# Patient Record
Sex: FEMALE | Race: BLACK OR AFRICAN AMERICAN | NOT HISPANIC OR LATINO
[De-identification: names, ages, dates, MRNs, and addresses within clinical notes are randomized per-mention and may not be internally consistent; named-entity substitution may affect disease eponyms.]

---

## 2017-01-06 ENCOUNTER — APPOINTMENT (OUTPATIENT)
Dept: CARDIOLOGY | Facility: CLINIC | Age: 72
End: 2017-01-06

## 2017-04-03 ENCOUNTER — APPOINTMENT (OUTPATIENT)
Dept: CARDIOLOGY | Facility: CLINIC | Age: 72
End: 2017-04-03

## 2017-04-03 VITALS — HEART RATE: 65 BPM | SYSTOLIC BLOOD PRESSURE: 130 MMHG | DIASTOLIC BLOOD PRESSURE: 80 MMHG

## 2017-04-03 VITALS — WEIGHT: 244 LBS | BODY MASS INDEX: 40.65 KG/M2 | HEIGHT: 65 IN

## 2017-04-03 DIAGNOSIS — R73.03 PREDIABETES.: ICD-10-CM

## 2017-04-14 ENCOUNTER — APPOINTMENT (OUTPATIENT)
Dept: CARDIOLOGY | Facility: CLINIC | Age: 72
End: 2017-04-14

## 2017-06-01 ENCOUNTER — APPOINTMENT (OUTPATIENT)
Dept: CARDIOLOGY | Facility: CLINIC | Age: 72
End: 2017-06-01

## 2017-06-01 VITALS — BODY MASS INDEX: 38.27 KG/M2 | WEIGHT: 230 LBS

## 2017-06-01 VITALS — DIASTOLIC BLOOD PRESSURE: 58 MMHG | SYSTOLIC BLOOD PRESSURE: 90 MMHG

## 2017-10-02 ENCOUNTER — APPOINTMENT (OUTPATIENT)
Dept: CARDIOLOGY | Facility: CLINIC | Age: 72
End: 2017-10-02

## 2017-11-22 ENCOUNTER — APPOINTMENT (OUTPATIENT)
Dept: CARDIOLOGY | Facility: CLINIC | Age: 72
End: 2017-11-22

## 2018-05-25 ENCOUNTER — APPOINTMENT (OUTPATIENT)
Dept: CARDIOLOGY | Facility: CLINIC | Age: 73
End: 2018-05-25

## 2018-05-25 ENCOUNTER — INPATIENT (INPATIENT)
Facility: HOSPITAL | Age: 73
LOS: 3 days | Discharge: NURSING, REHAB OR LONG-TERM CARE | End: 2018-05-29
Attending: INTERNAL MEDICINE | Admitting: INTERNAL MEDICINE

## 2018-05-25 VITALS — DIASTOLIC BLOOD PRESSURE: 60 MMHG | BODY MASS INDEX: 30.62 KG/M2 | SYSTOLIC BLOOD PRESSURE: 100 MMHG | WEIGHT: 184 LBS

## 2018-05-25 VITALS
HEART RATE: 79 BPM | RESPIRATION RATE: 18 BRPM | SYSTOLIC BLOOD PRESSURE: 110 MMHG | TEMPERATURE: 99 F | DIASTOLIC BLOOD PRESSURE: 58 MMHG | OXYGEN SATURATION: 95 %

## 2018-05-25 DIAGNOSIS — Z95.0 PRESENCE OF CARDIAC PACEMAKER: Chronic | ICD-10-CM

## 2018-05-25 LAB
ALBUMIN SERPL ELPH-MCNC: 4.3 G/DL — SIGNIFICANT CHANGE UP (ref 3.5–5.2)
ALP SERPL-CCNC: 49 U/L — SIGNIFICANT CHANGE UP (ref 30–115)
ALT FLD-CCNC: 13 U/L — SIGNIFICANT CHANGE UP (ref 0–41)
ANION GAP SERPL CALC-SCNC: 13 MMOL/L — SIGNIFICANT CHANGE UP (ref 7–14)
APTT BLD: 33.8 SEC — SIGNIFICANT CHANGE UP (ref 27–39.2)
AST SERPL-CCNC: 35 U/L — SIGNIFICANT CHANGE UP (ref 0–41)
BASOPHILS # BLD AUTO: 0.05 K/UL — SIGNIFICANT CHANGE UP (ref 0–0.2)
BASOPHILS NFR BLD AUTO: 0.7 % — SIGNIFICANT CHANGE UP (ref 0–1)
BILIRUB SERPL-MCNC: 0.4 MG/DL — SIGNIFICANT CHANGE UP (ref 0.2–1.2)
BUN SERPL-MCNC: 28 MG/DL — HIGH (ref 10–20)
CALCIUM SERPL-MCNC: 9.3 MG/DL — SIGNIFICANT CHANGE UP (ref 8.5–10.1)
CHLORIDE SERPL-SCNC: 99 MMOL/L — SIGNIFICANT CHANGE UP (ref 98–110)
CK MB CFR SERPL CALC: <0.1 NG/ML — LOW (ref 0.6–6.3)
CK SERPL-CCNC: 131 U/L — SIGNIFICANT CHANGE UP (ref 0–225)
CO2 SERPL-SCNC: 28 MMOL/L — SIGNIFICANT CHANGE UP (ref 17–32)
CREAT SERPL-MCNC: 1.2 MG/DL — SIGNIFICANT CHANGE UP (ref 0.7–1.5)
EOSINOPHIL # BLD AUTO: 0.11 K/UL — SIGNIFICANT CHANGE UP (ref 0–0.7)
EOSINOPHIL NFR BLD AUTO: 1.5 % — SIGNIFICANT CHANGE UP (ref 0–8)
GAS PNL BLDV: SIGNIFICANT CHANGE UP
GLUCOSE SERPL-MCNC: 97 MG/DL — SIGNIFICANT CHANGE UP (ref 70–99)
HCT VFR BLD CALC: 36.7 % — LOW (ref 37–47)
HGB BLD-MCNC: 11.8 G/DL — LOW (ref 12–16)
IMM GRANULOCYTES NFR BLD AUTO: 0.4 % — HIGH (ref 0.1–0.3)
INR BLD: 3.01 RATIO — HIGH (ref 0.65–1.3)
LYMPHOCYTES # BLD AUTO: 2.64 K/UL — SIGNIFICANT CHANGE UP (ref 1.2–3.4)
LYMPHOCYTES # BLD AUTO: 36.8 % — SIGNIFICANT CHANGE UP (ref 20.5–51.1)
MCHC RBC-ENTMCNC: 23 PG — LOW (ref 27–31)
MCHC RBC-ENTMCNC: 32.2 G/DL — SIGNIFICANT CHANGE UP (ref 32–37)
MCV RBC AUTO: 71.7 FL — LOW (ref 81–99)
MONOCYTES # BLD AUTO: 0.43 K/UL — SIGNIFICANT CHANGE UP (ref 0.1–0.6)
MONOCYTES NFR BLD AUTO: 6 % — SIGNIFICANT CHANGE UP (ref 1.7–9.3)
NEUTROPHILS # BLD AUTO: 3.91 K/UL — SIGNIFICANT CHANGE UP (ref 1.4–6.5)
NEUTROPHILS NFR BLD AUTO: 54.6 % — SIGNIFICANT CHANGE UP (ref 42.2–75.2)
PLATELET # BLD AUTO: 318 K/UL — SIGNIFICANT CHANGE UP (ref 130–400)
POTASSIUM SERPL-MCNC: 5.6 MMOL/L — HIGH (ref 3.5–5)
POTASSIUM SERPL-SCNC: 5.6 MMOL/L — HIGH (ref 3.5–5)
PROT SERPL-MCNC: 7.5 G/DL — SIGNIFICANT CHANGE UP (ref 6–8)
PROTHROM AB SERPL-ACNC: 33.3 SEC — HIGH (ref 9.95–12.87)
RBC # BLD: 5.12 M/UL — SIGNIFICANT CHANGE UP (ref 4.2–5.4)
RBC # FLD: 16.9 % — HIGH (ref 11.5–14.5)
SODIUM SERPL-SCNC: 140 MMOL/L — SIGNIFICANT CHANGE UP (ref 135–146)
TROPONIN T SERPL-MCNC: <0.01 NG/ML — SIGNIFICANT CHANGE UP
WBC # BLD: 7.17 K/UL — SIGNIFICANT CHANGE UP (ref 4.8–10.8)
WBC # FLD AUTO: 7.17 K/UL — SIGNIFICANT CHANGE UP (ref 4.8–10.8)

## 2018-05-25 RX ORDER — FUROSEMIDE 40 MG
0 TABLET ORAL
Qty: 0 | Refills: 0 | COMMUNITY

## 2018-05-25 RX ORDER — LISINOPRIL 2.5 MG/1
1 TABLET ORAL
Qty: 0 | Refills: 0 | COMMUNITY

## 2018-05-25 RX ORDER — SODIUM CHLORIDE 9 MG/ML
500 INJECTION INTRAMUSCULAR; INTRAVENOUS; SUBCUTANEOUS ONCE
Qty: 0 | Refills: 0 | Status: COMPLETED | OUTPATIENT
Start: 2018-05-25 | End: 2018-05-25

## 2018-05-25 RX ORDER — B-COMPLEX WITH VITAMIN C
TABLET ORAL
Refills: 0 | Status: ACTIVE | COMMUNITY

## 2018-05-25 RX ORDER — SIMVASTATIN 20 MG/1
10 TABLET, FILM COATED ORAL AT BEDTIME
Qty: 0 | Refills: 0 | Status: DISCONTINUED | OUTPATIENT
Start: 2018-05-25 | End: 2018-05-29

## 2018-05-25 RX ORDER — SIMVASTATIN 20 MG/1
1 TABLET, FILM COATED ORAL
Qty: 0 | Refills: 0 | COMMUNITY

## 2018-05-25 RX ORDER — ASPIRIN/CALCIUM CARB/MAGNESIUM 324 MG
81 TABLET ORAL DAILY
Qty: 0 | Refills: 0 | Status: DISCONTINUED | OUTPATIENT
Start: 2018-05-25 | End: 2018-05-29

## 2018-05-25 RX ORDER — ASPIRIN/CALCIUM CARB/MAGNESIUM 324 MG
1 TABLET ORAL
Qty: 0 | Refills: 0 | COMMUNITY

## 2018-05-25 RX ORDER — SODIUM CHLORIDE 9 MG/ML
1000 INJECTION, SOLUTION INTRAVENOUS ONCE
Qty: 0 | Refills: 0 | Status: COMPLETED | OUTPATIENT
Start: 2018-05-25 | End: 2018-05-25

## 2018-05-25 RX ORDER — FUROSEMIDE 40 MG
40 TABLET ORAL DAILY
Qty: 0 | Refills: 0 | Status: DISCONTINUED | OUTPATIENT
Start: 2018-05-25 | End: 2018-05-29

## 2018-05-25 RX ADMIN — SODIUM CHLORIDE 4000 MILLILITER(S): 9 INJECTION, SOLUTION INTRAVENOUS at 16:00

## 2018-05-25 NOTE — H&P ADULT - PMH
Bradycardia  s/p pacemaker but not pacemaker dependent  Coronary artery disease involving native coronary artery of native heart without angina pectoris    Rheumatoid arthritis, involving unspecified site, unspecified rheumatoid factor presence

## 2018-05-25 NOTE — H&P ADULT - NSHPREVIEWOFSYSTEMS_GEN_ALL_CORE
REVIEW OF SYSTEMS:    CONSTITUTIONAL: No weakness, fevers or chills  EYES/ENT: No visual changes;  No vertigo or throat pain   NECK: No pain or stiffness  RESPIRATORY: no cough/SOB  CARDIOVASCULAR: No chest pain or palpitations  GASTROINTESTINAL: No abdominal or epigastric pain. No nausea, vomiting, or hematemesis; No diarrhea or constipation. No melena or hematochezia.  GENITOURINARY: No dysuria, frequency or hematuria  NEUROLOGICAL: involuntary shaking of L>R side of body  SKIN: No itching, rashes

## 2018-05-25 NOTE — ED ADULT NURSE REASSESSMENT NOTE - NS ED NURSE REASSESS COMMENT FT1
Patient is A&O X 4 , awaiting ICU Bed.patient stated she is still feeling the same .No family member at bedside .Will continue to moniter

## 2018-05-25 NOTE — ED PROVIDER NOTE - OBJECTIVE STATEMENT
73yo F with PMHx CHF/PPM, on coumadin, HLD, anxiety, sent from cardiologist Dr. Wick's office for chest pain. Patient was in office for routine PPM check. Per pt, she has been having intermittent chest pains for 6 months. Pain is in middle of chest and radiates to right chest. Non exertional, not pleuritic. No back pain. No SOB, cough, palpitations. No leg swelling. Also states that she has uncontrollable shaking/tremors of her extremities for past 6 years on and off. No h/o seizures, no LOC during tremors. Denies fever, headache, lightheadedness, nausea, vomiting, diarrhea, abd pain, dysuria.

## 2018-05-25 NOTE — H&P ADULT - HISTORY OF PRESENT ILLNESS
72F with PMH of systolic CHF, symptomatic bradycardia s/p pacemaker but not pacemaker dependent, anxiety, DLD, came in with left sided shakiness and chest pain. She is complaining of this shakiness for more than 4 years but she hasn't seen any doctor so far for that reason. She shakiness is nonrhythmic moves to right side also when severe, controlled at rest and worse with activity a/w involuntary movement in lips as it worsens  but not a/w change in mental status, LOC, incontinence , facial tics or coprolalia.    She also c/o on-ff chest pain x > 6 months that is mid-sternal to mild intensity epigatric/right right upper quardrent, non-radiating, not a/w nausea/sweating. She has been following Dr Giron for that. She was today sent for further evaluation.    In ED she was found to have BP in 90s/50s but no other labs being abnormal.    ROS: neg

## 2018-05-25 NOTE — H&P ADULT - NSHPLABSRESULTS_GEN_ALL_CORE
Labs:                         11.8<L>  7.17    )-----------(   318      ( 25 May 2018 15:03 )              36.7<L>    Neutro%  54.6    Lympho%  36.8    Mono%    6.0     Bands    x        05-25    140  |  99  |  28<H>  ----------------------------<  97  5.6<H>   |  28  |  1.2    Ca    9.3      25 May 2018 15:03    TPro  7.5  /  Alb  4.3  /  TBili  0.4  /  DBili  x   /  AST  35  /  ALT  13  /  AlkPhos  49  05-25      PT/INR - ( 25 May 2018 15:03 )   PT: 33.30 sec;   INR: 3.01 ratio         PTT - ( 25 May 2018 15:03 )  PTT:33.8 sec    CARDIAC MARKERS ( 25 May 2018 15:03 )  x     / <0.01 ng/mL / 131 U/L / x     / <0.1 ng/mL        LIVER FUNCTIONS - ( 25 May 2018 15:03 )  Alb: 4.3 g/dL / Pro: 7.5 g/dL / ALK PHOS: 49 U/L / ALT: 13 U/L / AST: 35 U/L / GGT: x

## 2018-05-25 NOTE — ED PROVIDER NOTE - ATTENDING CONTRIBUTION TO CARE
I have seen this patient with a scribe. Please see progress note section for scribe documentation of my history and physical examination and plan. I agree with scribe documentation except as indicated in my notes. I personally evaluated the patient. I reviewed the Resident’s or Physician Assistant’s note (as assigned above), and agree with the findings and plan except as documented in my note.

## 2018-05-25 NOTE — ED PROVIDER NOTE - PROGRESS NOTE DETAILS
71 y/o F with PMH of CHF, pacemaker, arthritis coming in c/o R sided CP. Pt notes that she had R sided CP since she woke up this morning and states that she has been very shaky today. No radiating. No SOB. No nausea, vomiting or diarrhea. On exam: Pt is well-appearing, NAD, WDWN, patient sitting up in bed, providing appropriate history. NCAT. PERRLA 3mm b/l, no nystagmus, EOMI. HEENT normal, no pooling of secretions. (+)Full passive ROM in neck. S1S2, no MRG. CTABL, no WRC.  Chest: Under R breast, no evidence of shingles, lesions or rash. L chest wall pacemaker sight identified, non-tender. Abdomen soft, NT/ND, no rebound or guarding, no CVAT. No leg edema, +symmetric pulses b/l. CNII-XII grossly intact. No rash. Plan: Labs, imaging, and reassess. Low BP noted. INR 3.0. Pt without new symptoms, no current chest pain, no cough, no vomiting, no abd pain. Denies bloody stool/melena. Rectal exam reveals light brown guaiac negative stool. Called by nurse for IV infiltration. 2nd IV placed using US. Spoke with Dr. Wick, states pt was c/o "shaking" and chest pain on and off for 7 years. Was crying in the office. EKG was normal and PPM was interrogated with no issues. Sent to ED for further eval of chest pain. Pt remains hypotensive after 1L of fluid. Clinically, patient appears well and is sitting in bed eating dinner. Denies new symptoms. Not febrile, WBC and lactate are WNL. No evidence of bleeding. Bedside US does not reveal significant hypokinesis. Hypotension currently unexplained. Spoke with ICU resident and approved for ICU for continued monitoring of condition. Case endorsed to ICU resident. Spoke with nurse from [facility] who called regarding patient's condition. Informed her that pt will be staying inpatient for further eval.

## 2018-05-25 NOTE — ED PROVIDER NOTE - NS ED ROS FT
Constitutional: No fever  Eyes:  No visual changes  ENMT:  No neck pain  Cardiac:  +chest pain. No palpitations  Respiratory:  No cough, SOB  GI:  No nausea, vomiting, diarrhea, abdominal pain.  :  No dysuria  MS:  +tremors. No back pain.  Neuro:  No headache or lightheadedness  Skin:  No skin rash  Endocrine: No history of thyroid disease or diabetes  Except as documented in the HPI,  all other systems are negative.

## 2018-05-25 NOTE — ED ADULT NURSE NOTE - OBJECTIVE STATEMENT
patient states woke up with the shakes today that comes and goes denies any pain denies n/v/d. from [facility]. patient a&ox3, ambulates steady with assist

## 2018-05-25 NOTE — H&P ADULT - NSHPPHYSICALEXAM_GEN_ALL_CORE
T(C): 36.4 (05-25-18 @ 20:14), Max: 37.1 (05-25-18 @ 12:25)  HR: 83 (05-25-18 @ 20:14) (62 - 89)  BP: 99/53 (05-25-18 @ 20:14) (87/52 - 110/58)  RR: 18 (05-25-18 @ 20:14) (18 - 18)  SpO2: 99% (05-25-18 @ 15:47) (95% - 99%)    PHYSICAL EXAM:  GENERAL: well-developed, pt shakes as she speaks and shaking gets worse until pt starts crying  HEAD:  Atraumatic, Normocephalic  EYES: EOMI, PERRLA, conjunctiva and sclera clear  NECK: Supple, No JVD  CHEST/LUNG: Clear to auscultation bilaterally; No wheeze  HEART: Regular rate and rhythm; No murmurs, rubs, or gallops  ABDOMEN: Soft, Nontender, Nondistended; Bowel sounds present  EXTREMITIES:  2+ Peripheral Pulses, No clubbing, cyanosis, or edema  PSYCH: AAOx3  NEUROLOGY: non-focal  SKIN: No rashes or lesions

## 2018-05-25 NOTE — ED PROVIDER NOTE - CONDUCTED A DETAILED DISCUSSION WITH PATIENT AND/OR GUARDIAN REGARDING, MDM
lab results/radiology results/return to ED if symptoms worsen, persist or questions arise/need to admit

## 2018-05-25 NOTE — ED ADULT NURSE REASSESSMENT NOTE - NS ED NURSE REASSESS COMMENT FT1
md martinez notified pt right arm noted to be swollen where IV site is, IVL removed, arm elevated with warm compress.

## 2018-05-25 NOTE — H&P ADULT - ASSESSMENT
Patient is a 72y old  Female who presents with a chief complaint of chest pain x 6 months and involuntary shaking of left side of his body x > 4 years.     In ED was found to have asymptomatic low BP in 90s/50s with no increased WBC, fever, Lactic acid wnl. She was given LR bolus 1liter.    PAST MEDICAL & SURGICAL HISTORY:  Coronary artery disease involving native coronary artery of native heart without angina pectoris  Bradycardia: s/p pacemaker but not pacemaker dependent  Rheumatoid arthritis, involving unspecified site, unspecified rheumatoid factor presence  Artificial pacemaker    # Asymptomatic Hypotension:  - admit to ICU for observation  - in ED once has SPB around 110 and after that consistently in 90s pt being asymptomatic. If the BP remained same till morning will consider 1st set was error.  - stop BP meds and decrease lasix dose to half  - ED didn't do CMP, UA: will check for next round. But clinically no signs of infection.    # Left sided shaking:  - neurology con  - will not do any imaging now.  - no h/o heat trauma    # CAD:  - c/w ASA, statin    # Symptomatic Bradycardia:  - stable    # Pt is on coumadin for unknown reason, will stop it now as INR>3. start once it falls <2.5  from NH Patient is a 72y old  Female who presents with a chief complaint of chest pain x 6 months and involuntary shaking of left side of his body x > 4 years.     In ED was found to have asymptomatic low BP in 90s/50s with no increased WBC, fever, Lactic acid wnl. She was given LR bolus 1liter.    PAST MEDICAL & SURGICAL HISTORY:  Coronary artery disease involving native coronary artery of native heart without angina pectoris  Bradycardia: s/p pacemaker but not pacemaker dependent  Rheumatoid arthritis, involving unspecified site, unspecified rheumatoid factor presence  Artificial pacemaker    # Asymptomatic Hypotension:  - admit to ICU for observation  - in ED once has SPB around 110 and after that consistently in 90s pt being asymptomatic. If the BP remained same till morning will consider 1st set was error.  - stop BP meds and decrease lasix dose to half  - ED didn't do CMP, UA: will check for next round. But clinically no signs of infection.    # Chest pain:  - CE x 1neg, EKG wnl, will repeat one more time    # Left sided shaking:  - neurology con  - will not do any imaging now.  - no h/o heat trauma    # CAD:  - c/w ASA, statin    # Symptomatic Bradycardia:  - stable    # Pt is on coumadin for unknown reason, will stop it now as INR>3. start once it falls <2.5  from NH

## 2018-05-26 LAB
ALBUMIN SERPL ELPH-MCNC: 3.6 G/DL — SIGNIFICANT CHANGE UP (ref 3.5–5.2)
ALP SERPL-CCNC: 59 U/L — SIGNIFICANT CHANGE UP (ref 30–115)
ALT FLD-CCNC: 9 U/L — SIGNIFICANT CHANGE UP (ref 0–41)
ANION GAP SERPL CALC-SCNC: 14 MMOL/L — SIGNIFICANT CHANGE UP (ref 7–14)
AST SERPL-CCNC: 13 U/L — SIGNIFICANT CHANGE UP (ref 0–41)
BILIRUB SERPL-MCNC: 0.3 MG/DL — SIGNIFICANT CHANGE UP (ref 0.2–1.2)
BUN SERPL-MCNC: 32 MG/DL — HIGH (ref 10–20)
CALCIUM SERPL-MCNC: 8.5 MG/DL — SIGNIFICANT CHANGE UP (ref 8.5–10.1)
CHLORIDE SERPL-SCNC: 102 MMOL/L — SIGNIFICANT CHANGE UP (ref 98–110)
CHOLEST SERPL-MCNC: 151 MG/DL — SIGNIFICANT CHANGE UP (ref 100–200)
CK MB CFR SERPL CALC: <0.1 NG/ML — LOW (ref 0.6–6.3)
CO2 SERPL-SCNC: 26 MMOL/L — SIGNIFICANT CHANGE UP (ref 17–32)
CREAT SERPL-MCNC: 1.1 MG/DL — SIGNIFICANT CHANGE UP (ref 0.7–1.5)
ESTIMATED AVERAGE GLUCOSE: 128 MG/DL — HIGH (ref 68–114)
GLUCOSE SERPL-MCNC: 134 MG/DL — HIGH (ref 70–99)
HBA1C BLD-MCNC: 6.1 % — HIGH (ref 4–5.6)
HDLC SERPL-MCNC: 44 MG/DL — SIGNIFICANT CHANGE UP (ref 40–125)
LACTATE SERPL-SCNC: 0.9 MMOL/L — SIGNIFICANT CHANGE UP (ref 0.5–2.2)
LIPID PNL WITH DIRECT LDL SERPL: 88 MG/DL — SIGNIFICANT CHANGE UP (ref 4–129)
MAGNESIUM SERPL-MCNC: 2 MG/DL — SIGNIFICANT CHANGE UP (ref 1.8–2.4)
PHOSPHATE SERPL-MCNC: 3.4 MG/DL — SIGNIFICANT CHANGE UP (ref 2.1–4.9)
POTASSIUM SERPL-MCNC: 4.1 MMOL/L — SIGNIFICANT CHANGE UP (ref 3.5–5)
POTASSIUM SERPL-SCNC: 4.1 MMOL/L — SIGNIFICANT CHANGE UP (ref 3.5–5)
PROT SERPL-MCNC: 5.9 G/DL — LOW (ref 6–8)
SODIUM SERPL-SCNC: 142 MMOL/L — SIGNIFICANT CHANGE UP (ref 135–146)
T4 AB SER-ACNC: 6.4 UG/DL — SIGNIFICANT CHANGE UP (ref 4.6–12)
TOTAL CHOLESTEROL/HDL RATIO MEASUREMENT: 3.4 RATIO — LOW (ref 4–5.5)
TRIGL SERPL-MCNC: 131 MG/DL — SIGNIFICANT CHANGE UP (ref 10–149)
TROPONIN T SERPL-MCNC: <0.01 NG/ML — SIGNIFICANT CHANGE UP
TSH SERPL-MCNC: 1.61 UIU/ML — SIGNIFICANT CHANGE UP (ref 0.27–4.2)
TYPE + AB SCN PNL BLD: SIGNIFICANT CHANGE UP

## 2018-05-26 RX ADMIN — SODIUM CHLORIDE 2000 MILLILITER(S): 9 INJECTION INTRAMUSCULAR; INTRAVENOUS; SUBCUTANEOUS at 00:02

## 2018-05-26 RX ADMIN — Medication 81 MILLIGRAM(S): at 12:14

## 2018-05-26 RX ADMIN — SIMVASTATIN 10 MILLIGRAM(S): 20 TABLET, FILM COATED ORAL at 21:03

## 2018-05-26 NOTE — PROGRESS NOTE ADULT - ASSESSMENT
71 yo F w/ PMH of HFrEF, symptomatic bradycardia s/p PPM (NOT PPM dependent), anxiety, DLD p/w L-sided shaking + CP. In ED was found to have asymptomatic low BP in 90s/50s with no increased WBC, fever, Lactic acid wnl. She was given LR bolus 1liter.    #) Asymptomatic Hypotension  - A-line placed in ED  - holding home BP meds  - f//u Cardiology in AM    #) Chest pain - EKG wnl, CE -ve x1    #) L-sided shaking - no recent head trauma. Neuro consult?    #) CAD - c/w ASA, statin    #) Symptomatic Bradycardia - stable    DVT ppx: INR elevated for now (Coumadin at NH - unclear reasons)  Code status: FULL  Dispo: pending 73 yo F w/ PMH of HFrEF, symptomatic bradycardia s/p PPM (NOT PPM dependent), anxiety, DLD p/w L-sided shaking + CP. In ED was found to have asymptomatic low BP in 90s/50s with no increased WBC, fever, Lactic acid wnl. She was given LR bolus 1liter.    #) Asymptomatic Hypotension  - A-line placed in ED  - holding home BP meds  - f//u Cardiology in AM    #) Chest pain - EKG wnl, CE -ve x1    #) L-sided shaking - no recent head trauma. Neuro consult - pending    #) CAD - c/w ASA, statin    #) Symptomatic Bradycardia - stable    DVT ppx: INR elevated for now (Coumadin at NH - unclear reasons)  Code status: FULL  Dispo: pending 73 yo F w/ PMH of HFrEF, symptomatic bradycardia s/p PPM (NOT PPM dependent), anxiety, DLD p/w L-sided shaking + CP. In ED was found to have asymptomatic low BP in 90s/50s with no increased WBC, fever, Lactic acid wnl. She was given LR bolus 1liter.    #) Asymptomatic Hypotension  - possibly 2/2 to inaccurate readings. BP on L leg (no tremors) is stable and in line w/ A-line readings  - A-line placed in ED - will d/c  - holding home BP meds  - f//u Cardiology in AM    #) Chest pain - EKG wnl, CE -ve x2    #) L-sided shaking - no recent head trauma. Neuro consult - pending    #) CAD - c/w ASA, statin    #) Symptomatic Bradycardia - stable    DVT ppx: INR elevated for now (Coumadin at NH - unclear reasons)  Code status: FULL  Dispo: pending

## 2018-05-26 NOTE — CONSULT NOTE ADULT - ASSESSMENT
IMPRESSION:  hypotension falsely becuase on a-line is 130 /80 noninvasive sbp 70 reason of tremor   tremor chronic       PLAN:    CNS: neurology consult for chronic tremor     HEENT:  oral care   PULMONARY: keep pox > 92 %    CARDIOVASCULAR: keep is = os echo cardiology consult [place]       GI: GI prophylaxis.  Feeding     RENAL: follow lytes     INFECTIOUS DISEASE: follow cx     HEMATOLOGICAL:  DVT prophylaxis.    ENDOCRINE:  Follow up FS.  Insulin protocol if needed. check cortisol level     MUSCULOSKELETAL:  d/c a-line BP from leg non shaking same as a-line    transfer  to floor if ok by cardio   no active issue mental status at her baseline now sitting eating BP stable no need for pressors follow cardiology for the chronic cp  francis for retention follow on floor     CRITICAL CARE TIME SPENT: ***

## 2018-05-26 NOTE — CONSULT NOTE ADULT - SUBJECTIVE AND OBJECTIVE BOX
Patient is a 72y old  Female who presents with a chief complaint of left sided shakiness (25 May 2018 20:02)      HPI:  72F with PMH of systolic CHF, symptomatic bradycardia s/p pacemaker but not pacemaker dependent, anxiety, DLD, came in with left sided shakiness and chest pain. She is complaining of this shakiness for more than 4 years but she hasn't seen any doctor so far for that reason. She shakiness is nonrhythmic moves to right side also when severe, controlled at rest and worse with activity a/w involuntary movement in lips as it worsens  but not a/w change in mental status, LOC, incontinence , facial tics or coprolalia.    She also c/o on-ff chest pain x > 6 months that is mid-sternal to mild intensity epigatric/right right upper quardrent, non-radiating, not a/w nausea/sweating. She has been following Dr Giron for that. She was today sent for further evaluation.    In ED she was found to have BP in 90s/50s but no other labs being abnormal.  A-line was placed and now  SBP     ROS: neg (25 May 2018 20:02)      PAST MEDICAL & SURGICAL HISTORY:  Coronary artery disease involving native coronary artery of native heart without angina pectoris  Bradycardia: s/p pacemaker but not pacemaker dependent  Rheumatoid arthritis, involving unspecified site, unspecified rheumatoid factor presence  Artificial pacemaker    Allergies    No Known Allergies    Intolerances      FAMILY HISTORY:  No pertinent family history in first degree relatives    Home Medications:  Aspir 81 oral delayed release tablet: 1 tab(s) orally once a day (25 May 2018 20:18)  furosemide 40 mg oral tablet: orally 2 times a day (25 May 2018 20:18)  lisinopril 2.5 mg oral tablet: 1 tab(s) orally once a day (25 May 2018 20:18)  simvastatin 10 mg oral tablet: 1 tab(s) orally once a day (at bedtime) (25 May 2018 20:18)  warfarin 3 mg oral tablet: 1 tab(s) orally once a day (25 May 2018 20:18)    Occupation:  Alochol: Denied  Smoking: Denied  Drug Use: Denied  Marital Status:         ROS: as in HPI; All other systems reviewed are negative    ICU Vital Signs Last 24 Hrs  T(C): 37 (26 May 2018 08:00), Max: 37.3 (26 May 2018 00:00)  T(F): 98.6 (26 May 2018 08:00), Max: 99.1 (26 May 2018 00:00)  HR: 60 (26 May 2018 08:00) (60 - 100)  BP: 106/50 (26 May 2018 08:00) (87/52 - 110/58)  BP(mean): 68 (26 May 2018 08:00) (68 - 68)  ABP: 109/48 (26 May 2018 08:00) (100/28 - 134/66)  ABP(mean): 67 (26 May 2018 08:00) (46 - 92)  RR: 15 (26 May 2018 08:00) (15 - 57)  SpO2: 98% (26 May 2018 06:00) (95% - 100%)        Physical Examination:    General: awake follow simple command , tremor stable for 7 years     HEENT: Pupils equal, reactive to light.  Symmetric.    PULM: Clear to auscultation bilaterally, no significant sputum production    CVS: Regular rate and rhythm, no murmurs, rubs, or gallops    ABD: Soft, nondistended, nontender, normoactive bowel sounds, no masses    EXT: 1     SKIN: Warm and well perfused, no rashes noted.              I&O's Detail    25 May 2018 07:01  -  26 May 2018 07:00  --------------------------------------------------------  IN:    0.9% NaCl: 500 mL    Lactated Ringers IV Bolus: 1000 mL  Total IN: 1500 mL    OUT:    Voided: 500 mL  Total OUT: 500 mL    Total NET: 1000 mL            LABS:                        11.8   7.17  )-----------( 318      ( 25 May 2018 15:03 )             36.7     26 May 2018 05:05    142    |  102    |  32     ----------------------------<  134    4.1     |  26     |  1.1      Ca    8.5        26 May 2018 05:05  Phos  3.4       26 May 2018 05:05  Mg     2.0       26 May 2018 05:05    TPro  5.9    /  Alb  3.6    /  TBili  0.3    /  DBili  x      /  AST  13     /  ALT  9      /  AlkPhos  59     26 May 2018 05:05  Amylase x     lipase x          CARDIAC MARKERS ( 26 May 2018 05:05 )  x     / <0.01 ng/mL / x     / x     / <0.1 ng/mL  CARDIAC MARKERS ( 25 May 2018 15:03 )  x     / <0.01 ng/mL / 131 U/L / x     / <0.1 ng/mL      CAPILLARY BLOOD GLUCOSE  126 (26 May 2018 00:00)        PT/INR - ( 25 May 2018 15:03 )   PT: 33.30 sec;   INR: 3.01 ratio         PTT - ( 25 May 2018 15:03 )  PTT:33.8 sec    Culture    Lactate, Blood: 0.9 mmol/L (05-26-18 @ 05:05)      MEDICATIONS  (STANDING):  aspirin enteric coated 81 milliGRAM(s) Oral daily  furosemide    Tablet 40 milliGRAM(s) Oral daily  simvastatin 10 milliGRAM(s) Oral at bedtime    MEDICATIONS  (PRN):        RADIOLOGY: ***     CXR: no infiltarte   TLC:  OG:  ET tube:          ECHO:

## 2018-05-26 NOTE — PROGRESS NOTE ADULT - SUBJECTIVE AND OBJECTIVE BOX
SUBJECTIVE:    Denies any complaints or symptoms. Persistent hypotension, worse when sleeping, widened pulse pressure, though patient remains asymptomatic.    PAST MEDICAL & SURGICAL HISTORY  Coronary artery disease involving native coronary artery of native heart without angina pectoris  Bradycardia: s/p pacemaker but not pacemaker dependent  Rheumatoid arthritis, involving unspecified site, unspecified rheumatoid factor presence  Artificial pacemaker    SOCIAL HISTORY:  Negative for smoking/alcohol/drug use.     ALLERGIES:  No Known Allergies    MEDICATIONS:  STANDING MEDICATIONS  aspirin enteric coated 81 milliGRAM(s) Oral daily  furosemide    Tablet 40 milliGRAM(s) Oral daily  simvastatin 10 milliGRAM(s) Oral at bedtime    PRN MEDICATIONS    VITALS:   T(F): 99.1  HR: 66  BP: 99/53  RR: 27  SpO2: 98%    LABS:                        11.8   7.17  )-----------( 318      ( 25 May 2018 15:03 )             36.7     05-25    140  |  99  |  28<H>  ----------------------------<  97  5.6<H>   |  28  |  1.2    Ca    9.3      25 May 2018 15:03    TPro  7.5  /  Alb  4.3  /  TBili  0.4  /  DBili  x   /  AST  35  /  ALT  13  /  AlkPhos  49  05-25    PT/INR - ( 25 May 2018 15:03 )   PT: 33.30 sec;   INR: 3.01 ratio         PTT - ( 25 May 2018 15:03 )  PTT:33.8 sec      Troponin T, Serum: <0.01 ng/mL (05-25-18 @ 15:03)  Creatine Kinase, Serum: 131 U/L (05-25-18 @ 15:03)      CARDIAC MARKERS ( 25 May 2018 15:03 )  x     / <0.01 ng/mL / 131 U/L / x     / <0.1 ng/mL      PHYSICAL EXAM:  GEN: NAD, comfortable, frequent stuttering + shaking upon engaging  LUNGS: CTAB, no w/r/r  HEART: RRR, no m/r/g  ABD: soft, NT/ND, +BS  EXT: no edema, PP b/l  NEURO: AAOX3 SUBJECTIVE:    Denies any complaints or symptoms. Persistent hypotension, worse when sleeping, widened pulse pressure, though patient remains asymptomatic.    PAST MEDICAL & SURGICAL HISTORY  Coronary artery disease involving native coronary artery of native heart without angina pectoris  Bradycardia: s/p pacemaker but not pacemaker dependent  Rheumatoid arthritis, involving unspecified site, unspecified rheumatoid factor presence  Artificial pacemaker    SOCIAL HISTORY:  Negative for smoking/alcohol/drug use.     ALLERGIES:  No Known Allergies    MEDICATIONS:  STANDING MEDICATIONS  aspirin enteric coated 81 milliGRAM(s) Oral daily  furosemide    Tablet 40 milliGRAM(s) Oral daily  simvastatin 10 milliGRAM(s) Oral at bedtime    PRN MEDICATIONS    VITALS:   T(F): 99.1  HR: 66  BP: 99/53  RR: 27  SpO2: 98%    LABS:                        11.8   7.17  )-----------( 318      ( 25 May 2018 15:03 )             36.7     05-25    140  |  99  |  28<H>  ----------------------------<  97  5.6<H>   |  28  |  1.2    Ca    9.3      25 May 2018 15:03    TPro  7.5  /  Alb  4.3  /  TBili  0.4  /  DBili  x   /  AST  35  /  ALT  13  /  AlkPhos  49  05-25    PT/INR - ( 25 May 2018 15:03 )   PT: 33.30 sec;   INR: 3.01 ratio         PTT - ( 25 May 2018 15:03 )  PTT:33.8 sec      Troponin T, Serum: <0.01 ng/mL (05-25-18 @ 15:03)  Creatine Kinase, Serum: 131 U/L (05-25-18 @ 15:03)      CARDIAC MARKERS ( 25 May 2018 15:03 )  x     / <0.01 ng/mL / 131 U/L / x     / <0.1 ng/mL      PHYSICAL EXAM:  GEN: NAD, comfortable, frequent stuttering + shaking upon engaging  LUNGS: CTAB, no w/r/r  HEART: RRR, no m/r/g  ABD: soft, NT/ND, +BS  EXT: no edema, PP b/l  NEURO: AAOx3

## 2018-05-27 LAB
ALBUMIN SERPL ELPH-MCNC: 3.4 G/DL — LOW (ref 3.5–5.2)
ALP SERPL-CCNC: 51 U/L — SIGNIFICANT CHANGE UP (ref 30–115)
ALT FLD-CCNC: 8 U/L — SIGNIFICANT CHANGE UP (ref 0–41)
ANION GAP SERPL CALC-SCNC: 12 MMOL/L — SIGNIFICANT CHANGE UP (ref 7–14)
AST SERPL-CCNC: 16 U/L — SIGNIFICANT CHANGE UP (ref 0–41)
BASOPHILS # BLD AUTO: 0.05 K/UL — SIGNIFICANT CHANGE UP (ref 0–0.2)
BASOPHILS NFR BLD AUTO: 0.8 % — SIGNIFICANT CHANGE UP (ref 0–1)
BILIRUB SERPL-MCNC: 0.4 MG/DL — SIGNIFICANT CHANGE UP (ref 0.2–1.2)
BUN SERPL-MCNC: 18 MG/DL — SIGNIFICANT CHANGE UP (ref 10–20)
CALCIUM SERPL-MCNC: 8.7 MG/DL — SIGNIFICANT CHANGE UP (ref 8.5–10.1)
CHLORIDE SERPL-SCNC: 105 MMOL/L — SIGNIFICANT CHANGE UP (ref 98–110)
CO2 SERPL-SCNC: 28 MMOL/L — SIGNIFICANT CHANGE UP (ref 17–32)
CREAT SERPL-MCNC: 0.8 MG/DL — SIGNIFICANT CHANGE UP (ref 0.7–1.5)
EOSINOPHIL # BLD AUTO: 0.16 K/UL — SIGNIFICANT CHANGE UP (ref 0–0.7)
EOSINOPHIL NFR BLD AUTO: 2.7 % — SIGNIFICANT CHANGE UP (ref 0–8)
GLUCOSE SERPL-MCNC: 101 MG/DL — HIGH (ref 70–99)
HCT VFR BLD CALC: 32.6 % — LOW (ref 37–47)
HGB BLD-MCNC: 10.3 G/DL — LOW (ref 12–16)
IMM GRANULOCYTES NFR BLD AUTO: 0.3 % — SIGNIFICANT CHANGE UP (ref 0.1–0.3)
INR BLD: 1.31 RATIO — HIGH (ref 0.65–1.3)
LYMPHOCYTES # BLD AUTO: 2.25 K/UL — SIGNIFICANT CHANGE UP (ref 1.2–3.4)
LYMPHOCYTES # BLD AUTO: 37.9 % — SIGNIFICANT CHANGE UP (ref 20.5–51.1)
MCHC RBC-ENTMCNC: 23 PG — LOW (ref 27–31)
MCHC RBC-ENTMCNC: 31.6 G/DL — LOW (ref 32–37)
MCV RBC AUTO: 72.9 FL — LOW (ref 81–99)
MONOCYTES # BLD AUTO: 0.41 K/UL — SIGNIFICANT CHANGE UP (ref 0.1–0.6)
MONOCYTES NFR BLD AUTO: 6.9 % — SIGNIFICANT CHANGE UP (ref 1.7–9.3)
NEUTROPHILS # BLD AUTO: 3.05 K/UL — SIGNIFICANT CHANGE UP (ref 1.4–6.5)
NEUTROPHILS NFR BLD AUTO: 51.4 % — SIGNIFICANT CHANGE UP (ref 42.2–75.2)
PLATELET # BLD AUTO: 250 K/UL — SIGNIFICANT CHANGE UP (ref 130–400)
POTASSIUM SERPL-MCNC: 4.2 MMOL/L — SIGNIFICANT CHANGE UP (ref 3.5–5)
POTASSIUM SERPL-SCNC: 4.2 MMOL/L — SIGNIFICANT CHANGE UP (ref 3.5–5)
PROT SERPL-MCNC: 6 G/DL — SIGNIFICANT CHANGE UP (ref 6–8)
PROTHROM AB SERPL-ACNC: 14.2 SEC — HIGH (ref 9.95–12.87)
RBC # BLD: 4.47 M/UL — SIGNIFICANT CHANGE UP (ref 4.2–5.4)
RBC # FLD: 17.1 % — HIGH (ref 11.5–14.5)
SODIUM SERPL-SCNC: 145 MMOL/L — SIGNIFICANT CHANGE UP (ref 135–146)
WBC # BLD: 5.94 K/UL — SIGNIFICANT CHANGE UP (ref 4.8–10.8)
WBC # FLD AUTO: 5.94 K/UL — SIGNIFICANT CHANGE UP (ref 4.8–10.8)

## 2018-05-27 RX ORDER — CLONAZEPAM 1 MG
0.25 TABLET ORAL
Qty: 0 | Refills: 0 | Status: DISCONTINUED | OUTPATIENT
Start: 2018-05-27 | End: 2018-05-28

## 2018-05-27 RX ADMIN — Medication 40 MILLIGRAM(S): at 05:13

## 2018-05-27 RX ADMIN — Medication 0.25 MILLIGRAM(S): at 20:52

## 2018-05-27 RX ADMIN — Medication 81 MILLIGRAM(S): at 12:31

## 2018-05-27 RX ADMIN — SIMVASTATIN 10 MILLIGRAM(S): 20 TABLET, FILM COATED ORAL at 23:12

## 2018-05-27 NOTE — PROGRESS NOTE ADULT - SUBJECTIVE AND OBJECTIVE BOX
Patient is a 72y old Female who presents with a chief complaint of left sided shakiness (25 May 2018 20:02)    Currently admitted to medicine with the primary diagnosis of Hypotension    Today is hospital day 2d.    EVENTS LAST 24HRS: naoe    PAST MEDICAL & SURGICAL HISTORY  Coronary artery disease involving native coronary artery of native heart without angina pectoris  Bradycardia: s/p pacemaker but not pacemaker dependent  Rheumatoid arthritis, involving unspecified site, unspecified rheumatoid factor presence  Artificial pacemaker      REVIEW OF SYSTEMS:  negative except as documented in HPI    ALLERGIES:    MEDICATIONS:  STANDING MEDICATIONS  aspirin enteric coated 81 milliGRAM(s) Oral daily  furosemide    Tablet 40 milliGRAM(s) Oral daily  simvastatin 10 milliGRAM(s) Oral at bedtime    PRN MEDICATIONS    VITALS:         ICU Vital Signs Last 24 Hrs  T(C): 37.1 (27 May 2018 08:00), Max: 37.1 (27 May 2018 08:00)  T(F): 98.7 (27 May 2018 08:00), Max: 98.7 (27 May 2018 08:00)  HR: 60 (27 May 2018 08:00) (58 - 92)  BP: 128/54 (27 May 2018 08:00) (91/56 - 130/62)  BP(mean): 73 (27 May 2018 08:00) (67 - 90)  ABP: 134/62 (26 May 2018 12:18) (100/44 - 134/62)  ABP(mean): 90 (26 May 2018 12:18) (66 - 92)  RR: 18 (27 May 2018 08:00) (14 - 26)  SpO2: 98% (27 May 2018 08:00) (97% - 100%)          I&O's Detail    26 May 2018 07:01  -  27 May 2018 07:00  --------------------------------------------------------  IN:    Oral Fluid: 1320 mL  Total IN: 1320 mL    OUT:    Indwelling Catheter - Urethral: 1466 mL  Total OUT: 1466 mL    Total NET: -146 mL      27 May 2018 07:01  -  27 May 2018 08:29  --------------------------------------------------------  IN:    Oral Fluid: 240 mL  Total IN: 240 mL    OUT:    Indwelling Catheter - Urethral: 175 mL  Total OUT: 175 mL    Total NET: 65 mL          LABS:                        10.3   5.94  )-----------( 250      ( 27 May 2018 04:29 )             32.6     05-27    145  |  105  |  18  ----------------------------<  101<H>  4.2   |  28  |  0.8    Ca    8.7      27 May 2018 04:29  Phos  3.4     05-26  Mg     2.0     05-26    TPro  6.0  /  Alb  3.4<L>  /  TBili  0.4  /  DBili  x   /  AST  16  /  ALT  8   /  AlkPhos  51  05-27      CARDIAC MARKERS ( 26 May 2018 05:05 )  x     / <0.01 ng/mL / x     / x     / <0.1 ng/mL  CARDIAC MARKERS ( 25 May 2018 15:03 )  x     / <0.01 ng/mL / 131 U/L / x     / <0.1 ng/mL      CAPILLARY BLOOD GLUCOSE  126 (26 May 2018 00:00)        PT/INR - ( 25 May 2018 15:03 )   PT: 33.30 sec;   INR: 3.01 ratio         PTT - ( 25 May 2018 15:03 )  PTT:33.8 sec    CULTURES:      PHYSICAL EXAM:    General: No acute distress. tremulous.    HEENT: Pupils equal, reactive to light.  Symmetric.    PULM: Clear to auscultation bilaterally    CVS: Regular rate and rhythm    ABD: Soft, nondistended, nontender, normoactive bowel sounds    EXT: No edema, nontender    SKIN: Warm and well perfused, no rashes noted.

## 2018-05-27 NOTE — PROGRESS NOTE ADULT - ASSESSMENT
71 yo F w/ PMH of HFrEF, symptomatic bradycardia s/p PPM (NOT PPM dependent), anxiety, DLD p/w L-sided shaking + CP. In ED was found to have asymptomatic low BP in 90s/50s with no increased WBC, fever, Lactic acid wnl. She was given LR bolus 1liter.    #) Asymptomatic Hypotension  - possibly 2/2 to inaccurate readings. BP on L leg (no tremors) is stable and in line w/ A-line readings  - holding home BP meds  - f//u Cardiology     #) Chest pain - EKG wnl, CE -ve x2    #) L-sided shaking - no recent head trauma. Neuro consult - pending    #) CAD - c/w ASA, statin    #) Symptomatic Bradycardia - stable    DVT ppx: INR elevated for now (Coumadin at NH - unclear reasons)  Code status: FULL  Dispo: ? downgrade to floors

## 2018-05-27 NOTE — CHART NOTE - NSCHARTNOTEFT_GEN_A_CORE
ICU DOWNGRADE NOTE:    72y Female transferred to floor from ICU    Patient is a 72y old Female who presents with a chief complaint of left sided shakiness (25 May 2018 20:02)    The patient is currently admitted for the primary diagnosis of Hypotension    The patient was admitted to the unit for 2 Days.    The patient was never intubated and was never on pressors.     Indwelling vascular catheters: none    Urinary Catheter:     Disposition:    Code Status:    ICU COURSE OF EVENTS:  -------------------------------------------------------------------------------------------        -------------------------------------------------------------------------------------------    Current workup in progress:    SIGN OUT AT 05-27-18 @ 15:03 GIVEN TO: ICU DOWNGRADE NOTE:    72y Female transferred to floor from ICU    Patient is a 72y old Female who presents with a chief complaint of left sided shakiness (25 May 2018 20:02)    The patient is currently admitted for the primary diagnosis of Hypotension    The patient was admitted to the unit for 2 Days.    The patient was never intubated and was never on pressors.     Indwelling vascular catheters: none    Urinary Catheter: none    Disposition: stable    Code Status: full    ICU COURSE OF EVENTS: 72F with PMH of systolic CHF, symptomatic bradycardia s/p pacemaker but not pacemaker dependent, anxiety, DLD, came in from Dr. Wick's office with left sided shakiness and chest pain. Cardiac enzymes were negative and no EKG changes were noted. Her ppm was interrogated and negative for any events. Her generalized tremors caused pt much distress and neurology was consulted, recommended EEG. She was stable for downgrade to the floors.   -------------------------------------------------------------------------------------------        -------------------------------------------------------------------------------------------    Current workup in progress: f/u eeg result, f/u neuro recs, f/u cardio     SIGN OUT AT 05-27-18 @ 15:03 GIVEN TO:

## 2018-05-27 NOTE — PROGRESS NOTE ADULT - SUBJECTIVE AND OBJECTIVE BOX
Over Night Events:  Patient seen and examined BP stable being check in leg 	      ROS:  See HPI    PHYSICAL EXAM    ICU Vital Signs Last 24 Hrs  T(C): 37.1 (27 May 2018 08:00), Max: 37.1 (27 May 2018 08:00)  T(F): 98.7 (27 May 2018 08:00), Max: 98.7 (27 May 2018 08:00)  HR: 60 (27 May 2018 08:00) (58 - 92)  BP: 128/54 (27 May 2018 08:00) (91/56 - 130/62)  BP(mean): 73 (27 May 2018 08:00) (67 - 90)  ABP: 134/62 (26 May 2018 12:18) (104/44 - 134/62)  ABP(mean): 90 (26 May 2018 12:18) (66 - 92)  RR: 18 (27 May 2018 08:00) (14 - 26)  SpO2: 98% (27 May 2018 08:00) (97% - 100%)      General: awake   HEENT:                Lymph Nodes: NO cervical LN   Lungs: Bilateral BS  Cardiovascular: Regular   Abdomen: Soft, Positive BS  Extremities: No clubbing   Skin:   Neurological: move all ext     I&O's Detail    26 May 2018 07:01  -  27 May 2018 07:00  --------------------------------------------------------  IN:    Oral Fluid: 1320 mL  Total IN: 1320 mL    OUT:    Indwelling Catheter - Urethral: 1466 mL  Total OUT: 1466 mL    Total NET: -146 mL      27 May 2018 07:01  -  27 May 2018 09:19  --------------------------------------------------------  IN:    Oral Fluid: 240 mL  Total IN: 240 mL    OUT:    Indwelling Catheter - Urethral: 175 mL  Total OUT: 175 mL    Total NET: 65 mL          LABS:                          10.3   5.94  )-----------( 250      ( 27 May 2018 04:29 )             32.6         27 May 2018 04:29    145    |  105    |  18     ----------------------------<  101    4.2     |  28     |  0.8      Ca    8.7        27 May 2018 04:29  Phos  3.4       26 May 2018 05:05  Mg     2.0       26 May 2018 05:05    TPro  6.0    /  Alb  3.4    /  TBili  0.4    /  DBili  x      /  AST  16     /  ALT  8      /  AlkPhos  51     27 May 2018 04:29  Amylase x     lipase x                                                 PT/INR - ( 25 May 2018 15:03 )   PT: 33.30 sec;   INR: 3.01 ratio         PTT - ( 25 May 2018 15:03 )  PTT:33.8 sec                                           Lactate, Blood: 0.9 mmol/L (05-26-18 @ 05:05)    CARDIAC MARKERS ( 26 May 2018 05:05 )  x     / <0.01 ng/mL / x     / x     / <0.1 ng/mL  CARDIAC MARKERS ( 25 May 2018 15:03 )  x     / <0.01 ng/mL / 131 U/L / x     / <0.1 ng/mL                                                                                                                                             MEDICATIONS  (STANDING):  aspirin enteric coated 81 milliGRAM(s) Oral daily  furosemide    Tablet 40 milliGRAM(s) Oral daily  simvastatin 10 milliGRAM(s) Oral at bedtime    MEDICATIONS  (PRN):          Xrays:  TLC:  OG:  ET tube:                                                                                       ECHO:

## 2018-05-27 NOTE — PROGRESS NOTE ADULT - SUBJECTIVE AND OBJECTIVE BOX
Neurology Consult    Patient is a 72y old  Female who presents with a chief complaint of left sided shakiness (25 May 2018 20:02)      HPI:  72F with PMH of systolic CHF, symptomatic bradycardia s/p pacemaker but not pacemaker dependent, anxiety, DLD, came in with left sided shakiness and chest pain. She is complaining of this shakiness for more than 4 years but she hasn't seen any doctor so far for that reason. She shakiness is nonrhythmic moves to right side also when severe, controlled at rest and worse with activity a/w involuntary movement in lips as it worsens  but not a/w change in mental status, LOC, incontinence , facial tics or coprolalia.    She also c/o on-ff chest pain x > 6 months that is mid-sternal to mild intensity epigatric/right right upper quardrent, non-radiating, not a/w nausea/sweating. She has been following Dr Giron for that. She was today sent for further evaluation.    In ED she was found to have BP in 90s/50s but no other labs being abnormal.    ***  Patient describes having jerking of extremities beginning on left side then shortly thereafter also involving right side approximately 5-6 years ago.  she does not recall any particular illness at the time or any medication side effects that induced it.  She is not able to tell me any successful treatments for it, so I really  don't know what if anything she has tried.  She denies any family history of similar abnormal movements.  She notes it is not present at sleep.  She denies any seizures.    ROS: neg (25 May 2018 20:02)      PAST MEDICAL & SURGICAL HISTORY:  Coronary artery disease involving native coronary artery of native heart without angina pectoris  Bradycardia: s/p pacemaker but not pacemaker dependent  Rheumatoid arthritis, involving unspecified site, unspecified rheumatoid factor presence  Artificial pacemaker      FAMILY HISTORY:  No pertinent family history in first degree relatives      Social History: (-) x 3    Allergies    No Known Allergies    Intolerances        MEDICATIONS  (STANDING):  aspirin enteric coated 81 milliGRAM(s) Oral daily  furosemide    Tablet 40 milliGRAM(s) Oral daily  simvastatin 10 milliGRAM(s) Oral at bedtime    MEDICATIONS  (PRN):      Review of systems:    Constitutional: No fever, weight loss or fatigue    Eyes: No eye pain or discharge  ENMT:  No difficulty hearing; No sinus or throat pain  Neck: No pain or stiffness  Respiratory: No cough, wheezing, chills or hemoptysis  Cardiovascular: No chest pain, palpitations, shortness of breath, dyspnea on exertion  Gastrointestinal: No abdominal pain, nausea, vomiting or hematemesis; No diarrhea or constipation.   Genitourinary: No dysuria, frequency, hematuria or incontinence  Neurological: As per HPI  Skin: No rashes or lesions   Endocrine: No heat or cold intolerance; No hair loss  Musculoskeletal: No joint pain or swelling  Psychiatric: No depression, anxiety, mood swings  Heme/Lymph: No easy bruising or bleeding gums    Vital Signs Last 24 Hrs  T(C): 37.4 (27 May 2018 17:26), Max: 37.4 (27 May 2018 17:26)  T(F): 99.3 (27 May 2018 17:26), Max: 99.3 (27 May 2018 17:26)  HR: 60 (27 May 2018 17:26) (58 - 100)  BP: 119/63 (27 May 2018 17:26) (91/56 - 128/54)  BP(mean): 78 (27 May 2018 14:00) (67 - 106)  RR: 18 (27 May 2018 17:26) (14 - 22)  SpO2: 98% (27 May 2018 14:00) (97% - 98%)    Neurologic Examination:  General:  Appearance is consistent with chronologic age.    General: The patient is oriented to person, place, time and date.  -2/3 short term recall.   Language intact.  Nondysarthric.    Cranial nerves: intact VA, VFF.  EOMI w/o nystagmus, skew or reported double vision.  PERRL.  No ptosis/weakness of eyelid closure.  Facial sensation is normal with normal bite.  No facial asymmetry.  Hearing grossly intact b/l.  Palate elevates midline.  Tongue midline.  Motor examination:   Normal tone, bulk and range of motion.  Very frequent twitching of upper and lower extremities is noted, it comes in trains and L > R.  Some facial involvement is present as well.  She is able to speak despite the jerking.  Thoughts do not seem to be interupted.  Formal Muscle Strength Testing: (MRC grade R/L) 5/5 UE; 5/5 LE.  No observable drift.  Reflexes:   2+ b/l  biceps, triceps, brachioradialis, patella and Achilles.  Plantar response downgoing b/l.    Sensory examination:   Intact to light touch, temperature, and vibration in all extremities.  Cerebellum:   FTN/HKS intact with normal RAMONE in all limbs.  No dysmetria or dysdiadokinesia.      Labs:   CBC Full  -  ( 27 May 2018 04:29 )  WBC Count : 5.94 K/uL  Hemoglobin : 10.3 g/dL  Hematocrit : 32.6 %  Platelet Count - Automated : 250 K/uL  Mean Cell Volume : 72.9 fL  Mean Cell Hemoglobin : 23.0 pg  Mean Cell Hemoglobin Concentration : 31.6 g/dL  Auto Neutrophil # : 3.05 K/uL  Auto Lymphocyte # : 2.25 K/uL  Auto Monocyte # : 0.41 K/uL  Auto Eosinophil # : 0.16 K/uL  Auto Basophil # : 0.05 K/uL  Auto Neutrophil % : 51.4 %  Auto Lymphocyte % : 37.9 %  Auto Monocyte % : 6.9 %  Auto Eosinophil % : 2.7 %  Auto Basophil % : 0.8 %    05-27    145  |  105  |  18  ----------------------------<  101<H>  4.2   |  28  |  0.8    Ca    8.7      27 May 2018 04:29  Phos  3.4     05-26  Mg     2.0     05-26    TPro  6.0  /  Alb  3.4<L>  /  TBili  0.4  /  DBili  x   /  AST  16  /  ALT  8   /  AlkPhos  51  05-27    LIVER FUNCTIONS - ( 27 May 2018 04:29 )  Alb: 3.4 g/dL / Pro: 6.0 g/dL / ALK PHOS: 51 U/L / ALT: 8 U/L / AST: 16 U/L / GGT: x           PT/INR - ( 27 May 2018 11:40 )   PT: 14.20 sec;   INR: 1.31 ratio         Assessment:  This is a 72y Female with at least 5 years of jerking movements of extremities L > R.  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    Plan:   1.  EEG - > done and negative for epileptiform activity.  2.  CT head to r/o structural abnormality (doubt).  3.  Lab w/u:  TSH, T3, Free T4, Paraneoplastic antibody panel (complete), vitamin E level.  Check IgA level.  4.  Symptomatic treatment with clonazepam 0.25 mg po bid for 1 day then if no change may increase to 0.5 mg bid.  Other options include valproate if escalating doses of clonazepam fail.  If no change on either clonzazepm or valproate  then may consider trial of IVIG.  5.  Outpatient neurologic f/u.    05-27-18 @ 17:48

## 2018-05-27 NOTE — PROGRESS NOTE ADULT - ASSESSMENT
IMPRESSION:  hypotension falsely becuase on a-line is 130 /80 noninvasive sbp 70 reason of tremor   tremor chronic       PLAN:    CNS: neurology consult for chronic tremor     HEENT:  oral care   PULMONARY: keep pox > 92 %    CARDIOVASCULAR: keep is = os echo cardiology cfollow up       GI: GI prophylaxis.  Feeding     RENAL: follow lytes     INFECTIOUS DISEASE: follow cx     HEMATOLOGICAL:  DVT prophylaxis.    ENDOCRINE:  Follow up FS.  Insulin protocol if needed. check cortisol level     MUSCULOSKELETAL:     transfer  to tele or floor as per cardio       CRITICAL CARE TIME SPENT: *** IMPRESSION:  hypotension falsely becuase on a-line is 130 /80 noninvasive sbp 70 reason of tremor   tremor chronic       PLAN:    CNS: neurology consult for chronic tremor     HEENT:  oral care   PULMONARY: keep pox > 92 %    CARDIOVASCULAR: keep is = os echo cardiology cfollow up       GI: GI prophylaxis.  Feeding     RENAL: follow lytes     INFECTIOUS DISEASE: follow cx     HEMATOLOGICAL:  DVT prophylaxis.    ENDOCRINE:  Follow up FS.  Insulin protocol if needed. check cortisol level     MUSCULOSKELETAL:     transfer  to tele or floor as per cardio   d/c blanco     CRITICAL CARE TIME SPENT: ***

## 2018-05-28 ENCOUNTER — TRANSCRIPTION ENCOUNTER (OUTPATIENT)
Age: 73
End: 2018-05-28

## 2018-05-28 LAB
ANION GAP SERPL CALC-SCNC: 13 MMOL/L — SIGNIFICANT CHANGE UP (ref 7–14)
BUN SERPL-MCNC: 12 MG/DL — SIGNIFICANT CHANGE UP (ref 10–20)
CALCIUM SERPL-MCNC: 8.7 MG/DL — SIGNIFICANT CHANGE UP (ref 8.5–10.1)
CHLORIDE SERPL-SCNC: 107 MMOL/L — SIGNIFICANT CHANGE UP (ref 98–110)
CO2 SERPL-SCNC: 27 MMOL/L — SIGNIFICANT CHANGE UP (ref 17–32)
CREAT SERPL-MCNC: 0.7 MG/DL — SIGNIFICANT CHANGE UP (ref 0.7–1.5)
GLUCOSE SERPL-MCNC: 101 MG/DL — HIGH (ref 70–99)
HCT VFR BLD CALC: 33.6 % — LOW (ref 37–47)
HGB BLD-MCNC: 10.5 G/DL — LOW (ref 12–16)
MAGNESIUM SERPL-MCNC: 2.2 MG/DL — SIGNIFICANT CHANGE UP (ref 1.8–2.4)
MCHC RBC-ENTMCNC: 22.8 PG — LOW (ref 27–31)
MCHC RBC-ENTMCNC: 31.3 G/DL — LOW (ref 32–37)
MCV RBC AUTO: 73 FL — LOW (ref 81–99)
NRBC # BLD: 0 /100 WBCS — SIGNIFICANT CHANGE UP (ref 0–0)
PLATELET # BLD AUTO: 223 K/UL — SIGNIFICANT CHANGE UP (ref 130–400)
POTASSIUM SERPL-MCNC: 4.4 MMOL/L — SIGNIFICANT CHANGE UP (ref 3.5–5)
POTASSIUM SERPL-SCNC: 4.4 MMOL/L — SIGNIFICANT CHANGE UP (ref 3.5–5)
RBC # BLD: 4.6 M/UL — SIGNIFICANT CHANGE UP (ref 4.2–5.4)
RBC # FLD: 17.2 % — HIGH (ref 11.5–14.5)
SODIUM SERPL-SCNC: 147 MMOL/L — HIGH (ref 135–146)
WBC # BLD: 5.22 K/UL — SIGNIFICANT CHANGE UP (ref 4.8–10.8)
WBC # FLD AUTO: 5.22 K/UL — SIGNIFICANT CHANGE UP (ref 4.8–10.8)

## 2018-05-28 RX ORDER — WARFARIN SODIUM 2.5 MG/1
3 TABLET ORAL ONCE
Qty: 0 | Refills: 0 | Status: COMPLETED | OUTPATIENT
Start: 2018-05-28 | End: 2018-05-28

## 2018-05-28 RX ADMIN — Medication 40 MILLIGRAM(S): at 06:05

## 2018-05-28 RX ADMIN — Medication 81 MILLIGRAM(S): at 11:01

## 2018-05-28 RX ADMIN — Medication 0.25 MILLIGRAM(S): at 06:05

## 2018-05-28 RX ADMIN — WARFARIN SODIUM 3 MILLIGRAM(S): 2.5 TABLET ORAL at 21:39

## 2018-05-28 RX ADMIN — SIMVASTATIN 10 MILLIGRAM(S): 20 TABLET, FILM COATED ORAL at 21:37

## 2018-05-28 NOTE — DISCHARGE NOTE ADULT - HOSPITAL COURSE
72 year old female came in from Dr. Wick's office with left sided shakiness and chest pain. Cardiac enzymes were negative and no EKG changes were noted. Her ppm was interrogated and negative for any events. Her generalized tremors caused pt much distress and neurology was consulted, recommended EEG which was normal. Patient has had extensive work-up for tremors in past and can continue to follow-up as an outpatient

## 2018-05-28 NOTE — DISCHARGE NOTE ADULT - CARE PROVIDER_API CALL
Kevin Lowe (), Internal Medicine  [address] [place] [address]  Phone: [phone]  Fax: [phone]    Varun Panchal), Neurology  [address] [facility] [place]  [address]  [place] [address]  Phone: [phone]  Fax: [phone]

## 2018-05-28 NOTE — PROGRESS NOTE ADULT - SUBJECTIVE AND OBJECTIVE BOX
Patient was seen and examined. Spoke with RN. Chart reviewed.  No events overnight.  Vital Signs Last 24 Hrs  T(F): 98.5 (28 May 2018 06:00), Max: 99.3 (27 May 2018 17:26)  HR: 75 (28 May 2018 06:00) (59 - 100)  BP: 97/63 (28 May 2018 06:00) (93/47 - 123/57)  SpO2: 96% (27 May 2018 20:33) (96% - 98%)  MEDICATIONS  (STANDING):  aspirin enteric coated 81 milliGRAM(s) Oral daily  furosemide    Tablet 40 milliGRAM(s) Oral daily  simvastatin 10 milliGRAM(s) Oral at bedtime    MEDICATIONS  (PRN):    Labs:                        10.5   5.22  )-----------( 223      ( 28 May 2018 05:31 )             33.6                         10.3   5.94  )-----------( 250      ( 27 May 2018 04:29 )             32.6     27 May 2018 04:29    145    |  105    |  18     ----------------------------<  101    4.2     |  28     |  0.8      Ca    8.7        27 May 2018 04:29    TPro  6.0    /  Alb  3.4    /  TBili  0.4    /  DBili  x      /  AST  16     /  ALT  8      /  AlkPhos  51     27 May 2018 04:29    PT/INR - ( 27 May 2018 11:40 )   PT: 14.20 sec;   INR: 1.31 ratio               General: comfortable, NAD  Neurology:tremors  Head:  Normocephalic, atraumatic  ENT:  Mucosa moist, no ulcerations  Neck:  Supple, no JVD,   Skin: no breakdowns (as per RN)  Resp: CTA B/L  CV: RRR, S1S2,   GI: Soft, NT, bowel sounds  MS: No edema, + peripheral pulses, FROM all 4 extremity      A/P:  73 yo admitted for hypotension- resolved    Tremor has been extensively worked up in past- deemed psychogenic    Continue meds    DC today back to [facility]    DVT prophylaxis  Decubitus prevention- all measures as per RN protocol  Please call or text me with any questions or updates

## 2018-05-28 NOTE — DISCHARGE NOTE ADULT - PLAN OF CARE
[facility] Outpatient Neurology follow-up Outpatient Neurology follow-up if patient wants. Has already been worked up extensively

## 2018-05-28 NOTE — DISCHARGE NOTE ADULT - PATIENT PORTAL LINK FT
You can access the [facility] Patient Portal, offered by [facility], by registering with the following website: [email or web address]

## 2018-05-28 NOTE — PROGRESS NOTE ADULT - SUBJECTIVE AND OBJECTIVE BOX
Pt see in f/u for essential myoclonus.    Trial of 0.25 mg clonazepam bid has not changed symptoms.    Recommend increase clonazepam to 0.5 mg po bid.  May begin valproate 250 mg po bid.

## 2018-05-28 NOTE — DISCHARGE NOTE ADULT - MEDICATION SUMMARY - MEDICATIONS TO TAKE
I will START or STAY ON the medications listed below when I get home from the hospital:    Aspir 81 oral delayed release tablet  -- 1 tab(s) by mouth once a day  -- Indication: For Coronary artery disease involving native coronary artery of native heart without angina pectoris    lisinopril 2.5 mg oral tablet  -- 1 tab(s) by mouth once a day  -- Indication: For Coronary artery disease involving native coronary artery of native heart without angina pectoris    warfarin 3 mg oral tablet  -- 1 tab(s) by mouth once a day  -- Indication: For Artificial pacemaker    simvastatin 10 mg oral tablet  -- 1 tab(s) by mouth once a day (at bedtime)  -- Indication: For Coronary artery disease involving native coronary artery of native heart without angina pectoris    furosemide 40 mg oral tablet  -- orally 2 times a day  -- Indication: For Coronary artery disease involving native coronary artery of native heart without angina pectoris

## 2018-05-28 NOTE — DISCHARGE NOTE ADULT - CARE PLAN
Principal Discharge DX:	Tremor  Goal:	Resolution  Assessment and plan of treatment:	Outpatient Neurology follow-up Principal Discharge DX:	Tremor  Goal:	Resolution  Assessment and plan of treatment:	Outpatient Neurology follow-up if patient wants. Has already been worked up extensively

## 2018-05-28 NOTE — PROGRESS NOTE ADULT - SUBJECTIVE AND OBJECTIVE BOX
Patient is a 72y old  Female who presents with a chief complaint of left sided shakiness (28 May 2018 09:38)    HPI:  72F with PMH of systolic CHF, symptomatic bradycardia s/p pacemaker but not pacemaker dependent, anxiety, DLD, came in with left sided shakiness and chest pain. She is complaining of this shakiness for more than 4 years but she hasn't seen any doctor so far for that reason. She shakiness is nonrhythmic moves to right side also when severe, controlled at rest and worse with activity a/w involuntary movement in lips as it worsens  but not a/w change in mental status, LOC, incontinence , facial tics or coprolalia.    She also c/o on-ff chest pain x > 6 months that is mid-sternal to mild intensity epigatric/right right upper quardrent, non-radiating, not a/w nausea/sweating. She has been following Dr Giron for that. She was today sent for further evaluation.    In ED she was found to have BP in 90s/50s but no other labs being abnormal.    ROS: neg (25 May 2018 20:02)    PAST MEDICAL & SURGICAL HISTORY:  Coronary artery disease involving native coronary artery of native heart without angina pectoris  Bradycardia: s/p pacemaker but not pacemaker dependent  Rheumatoid arthritis, involving unspecified site, unspecified rheumatoid factor presence  Artificial pacemaker      Vital Signs Last 24 Hrs  T(F): 98.5 (05-28-18 @ 06:00), Max: 99.3 (05-27-18 @ 17:26)  HR: 75 (05-28-18 @ 06:00) (60 - 100)  BP: 97/63 (05-28-18 @ 06:00) (93/47 - 123/57)  RR: 17 (05-28-18 @ 06:00) (17 - 22)  SpO2: 96% (05-27-18 @ 20:33) (96% - 98%)    Labs:                         10.5   5.22  )-----------( 223      ( 28 May 2018 05:31 )             33.6     05-28    147<H>  |  107  |  12  ----------------------------<  101<H>  4.4   |  27  |  0.7    Ca    8.7      28 May 2018 05:31  Mg     2.2     05-28    TPro  6.0  /  Alb  3.4<L>  /  TBili  0.4  /  DBili  x   /  AST  16  /  ALT  8   /  AlkPhos  51  05-27    CAPILLARY BLOOD GLUCOSE        PT/INR - ( 27 May 2018 11:40 )   PT: 14.20 sec;   INR: 1.31 ratio                       MEDICATIONS  (STANDING):  aspirin enteric coated 81 milliGRAM(s) Oral daily  furosemide    Tablet 40 milliGRAM(s) Oral daily  simvastatin 10 milliGRAM(s) Oral at bedtime    MEDICATIONS  (PRN):      Physical Exam:  General: NAD  HEENT:   Lungs: CTA B/L  Heart: RRR, Normal S1S2  Abdomen: soft, NT/ND  Extremities: no c/c/e  Neuro: AAO x3    Radiology:

## 2018-05-28 NOTE — PROGRESS NOTE ADULT - ASSESSMENT
Patient is a 72y old  Female who presents with a chief complaint of chest pain x 6 months and involuntary shaking of left side of his body x > 4 years.     # Left sided shaking:  - neurology on board  - outpatient f/u  - d/c tmrw anticipated    # CAD:  - c/w ASA, statin    # Bradycardia  - stable    from NH  Anticipated for tomorrow  DVT ppx- On coumadin

## 2018-05-29 VITALS — TEMPERATURE: 97 F | DIASTOLIC BLOOD PRESSURE: 78 MMHG | SYSTOLIC BLOOD PRESSURE: 138 MMHG | HEART RATE: 60 BPM

## 2018-05-29 LAB
ANION GAP SERPL CALC-SCNC: 14 MMOL/L — SIGNIFICANT CHANGE UP (ref 7–14)
BUN SERPL-MCNC: 12 MG/DL — SIGNIFICANT CHANGE UP (ref 10–20)
CALCIUM SERPL-MCNC: 8.9 MG/DL — SIGNIFICANT CHANGE UP (ref 8.5–10.1)
CHLORIDE SERPL-SCNC: 104 MMOL/L — SIGNIFICANT CHANGE UP (ref 98–110)
CO2 SERPL-SCNC: 27 MMOL/L — SIGNIFICANT CHANGE UP (ref 17–32)
CREAT SERPL-MCNC: 0.8 MG/DL — SIGNIFICANT CHANGE UP (ref 0.7–1.5)
GLUCOSE SERPL-MCNC: 119 MG/DL — HIGH (ref 70–99)
POTASSIUM SERPL-MCNC: 4.2 MMOL/L — SIGNIFICANT CHANGE UP (ref 3.5–5)
POTASSIUM SERPL-SCNC: 4.2 MMOL/L — SIGNIFICANT CHANGE UP (ref 3.5–5)
SODIUM SERPL-SCNC: 145 MMOL/L — SIGNIFICANT CHANGE UP (ref 135–146)

## 2018-05-29 RX ADMIN — Medication 81 MILLIGRAM(S): at 12:25

## 2018-05-29 RX ADMIN — Medication 40 MILLIGRAM(S): at 06:21

## 2018-05-29 NOTE — PROGRESS NOTE ADULT - SUBJECTIVE AND OBJECTIVE BOX
Patient was seen and examined. Spoke with RN. Chart reviewed.    No events overnight.  Vital Signs Last 24 Hrs  T(F): 97.2 (29 May 2018 05:33), Max: 99.5 (28 May 2018 14:33)  HR: 60 (29 May 2018 05:33) (60 - 100)  BP: 128/98 (29 May 2018 05:33) (128/98 - 147/95)  SpO2: --  MEDICATIONS  (STANDING):  aspirin enteric coated 81 milliGRAM(s) Oral daily  furosemide    Tablet 40 milliGRAM(s) Oral daily  simvastatin 10 milliGRAM(s) Oral at bedtime    MEDICATIONS  (PRN):    Labs:                        10.5   5.22  )-----------( 223      ( 28 May 2018 05:31 )             33.6     29 May 2018 07:41    145    |  104    |  12     ----------------------------<  119    4.2     |  27     |  0.8    28 May 2018 05:31    147    |  107    |  12     ----------------------------<  101    4.4     |  27     |  0.7      Ca    8.9        29 May 2018 07:41  Ca    8.7        28 May 2018 05:31  Mg     2.2       28 May 2018 05:31      PT/INR - ( 27 May 2018 11:40 )   PT: 14.20 sec;   INR: 1.31 ratio                 Radiology:    General: comfortable, NAD  Neurology: A&Ox1, nonfocal  tremors  Head:  Normocephalic, atraumatic  ENT:  Mucosa moist, no ulcerations  Neck:  Supple, no JVD,   Skin: no breakdowns (as per RN)  Resp: CTA B/L  CV: RRR, S1S2,   GI: Soft, NT, bowel sounds  MS: No edema, + peripheral pulses, FROM all 4 extremity

## 2018-05-29 NOTE — PROGRESS NOTE ADULT - PROVIDER SPECIALTY LIST ADULT
CCU
Critical Care
Internal Medicine
Neurology
Pulmonology
Neurology

## 2018-05-30 ENCOUNTER — OUTPATIENT (OUTPATIENT)
Dept: OUTPATIENT SERVICES | Facility: HOSPITAL | Age: 73
LOS: 1 days | Discharge: HOME | End: 2018-05-30

## 2018-05-30 DIAGNOSIS — I10 ESSENTIAL (PRIMARY) HYPERTENSION: ICD-10-CM

## 2018-05-30 DIAGNOSIS — Z95.0 PRESENCE OF CARDIAC PACEMAKER: Chronic | ICD-10-CM

## 2018-05-31 DIAGNOSIS — I10 ESSENTIAL (PRIMARY) HYPERTENSION: ICD-10-CM

## 2018-05-31 PROBLEM — M06.9 RHEUMATOID ARTHRITIS, UNSPECIFIED: Chronic | Status: ACTIVE | Noted: 2018-05-25

## 2018-06-06 ENCOUNTER — OUTPATIENT (OUTPATIENT)
Dept: OUTPATIENT SERVICES | Facility: HOSPITAL | Age: 73
LOS: 1 days | Discharge: HOME | End: 2018-06-06

## 2018-06-06 DIAGNOSIS — Z95.0 PRESENCE OF CARDIAC PACEMAKER: Chronic | ICD-10-CM

## 2018-06-06 DIAGNOSIS — Z86.711 PERSONAL HISTORY OF PULMONARY EMBOLISM: ICD-10-CM

## 2018-06-08 LAB
CORTICOSTEROID BINDING GLOBULIN RESULT: 3.2 MG/DL — HIGH
CORTIS F/TOTAL MFR SERPL: 2.5 % — SIGNIFICANT CHANGE UP
CORTIS SERPL-MCNC: 8 UG/DL — SIGNIFICANT CHANGE UP
CORTISOL, FREE RESULT: 0.2 UG/DL — SIGNIFICANT CHANGE UP

## 2018-06-11 ENCOUNTER — OUTPATIENT (OUTPATIENT)
Dept: OUTPATIENT SERVICES | Facility: HOSPITAL | Age: 73
LOS: 1 days | Discharge: HOME | End: 2018-06-11

## 2018-06-11 DIAGNOSIS — M06.9 RHEUMATOID ARTHRITIS, UNSPECIFIED: ICD-10-CM

## 2018-06-11 DIAGNOSIS — I95.9 HYPOTENSION, UNSPECIFIED: ICD-10-CM

## 2018-06-11 DIAGNOSIS — F41.1 GENERALIZED ANXIETY DISORDER: ICD-10-CM

## 2018-06-11 DIAGNOSIS — I25.10 ATHEROSCLEROTIC HEART DISEASE OF NATIVE CORONARY ARTERY WITHOUT ANGINA PECTORIS: ICD-10-CM

## 2018-06-11 DIAGNOSIS — Z95.0 PRESENCE OF CARDIAC PACEMAKER: ICD-10-CM

## 2018-06-11 DIAGNOSIS — Z95.0 PRESENCE OF CARDIAC PACEMAKER: Chronic | ICD-10-CM

## 2018-06-11 DIAGNOSIS — F01.50 VASCULAR DEMENTIA WITHOUT BEHAVIORAL DISTURBANCE: ICD-10-CM

## 2018-06-11 DIAGNOSIS — K59.00 CONSTIPATION, UNSPECIFIED: ICD-10-CM

## 2018-06-11 DIAGNOSIS — R25.1 TREMOR, UNSPECIFIED: ICD-10-CM

## 2018-06-11 DIAGNOSIS — M19.90 UNSPECIFIED OSTEOARTHRITIS, UNSPECIFIED SITE: ICD-10-CM

## 2018-06-11 DIAGNOSIS — F41.9 ANXIETY DISORDER, UNSPECIFIED: ICD-10-CM

## 2018-06-11 DIAGNOSIS — E11.9 TYPE 2 DIABETES MELLITUS WITHOUT COMPLICATIONS: ICD-10-CM

## 2018-06-11 DIAGNOSIS — E78.5 HYPERLIPIDEMIA, UNSPECIFIED: ICD-10-CM

## 2018-06-11 DIAGNOSIS — I50.22 CHRONIC SYSTOLIC (CONGESTIVE) HEART FAILURE: ICD-10-CM

## 2018-06-11 DIAGNOSIS — Z86.711 PERSONAL HISTORY OF PULMONARY EMBOLISM: ICD-10-CM

## 2018-06-11 DIAGNOSIS — I82.91 CHRONIC EMBOLISM AND THROMBOSIS OF UNSPECIFIED VEIN: ICD-10-CM

## 2018-06-11 DIAGNOSIS — R07.9 CHEST PAIN, UNSPECIFIED: ICD-10-CM

## 2018-06-11 DIAGNOSIS — Z41.8 ENCOUNTER FOR OTHER PROCEDURES FOR PURPOSES OTHER THAN REMEDYING HEALTH STATE: ICD-10-CM

## 2018-06-11 DIAGNOSIS — R53.1 WEAKNESS: ICD-10-CM

## 2018-06-18 ENCOUNTER — OUTPATIENT (OUTPATIENT)
Dept: OUTPATIENT SERVICES | Facility: HOSPITAL | Age: 73
LOS: 1 days | Discharge: HOME | End: 2018-06-18

## 2018-06-18 DIAGNOSIS — Z95.0 PRESENCE OF CARDIAC PACEMAKER: Chronic | ICD-10-CM

## 2018-06-18 DIAGNOSIS — Z86.711 PERSONAL HISTORY OF PULMONARY EMBOLISM: ICD-10-CM

## 2018-06-25 ENCOUNTER — OUTPATIENT (OUTPATIENT)
Dept: OUTPATIENT SERVICES | Facility: HOSPITAL | Age: 73
LOS: 1 days | Discharge: HOME | End: 2018-06-25

## 2018-06-25 DIAGNOSIS — E11.9 TYPE 2 DIABETES MELLITUS WITHOUT COMPLICATIONS: ICD-10-CM

## 2018-06-25 DIAGNOSIS — Z95.0 PRESENCE OF CARDIAC PACEMAKER: Chronic | ICD-10-CM

## 2018-06-25 DIAGNOSIS — R07.9 CHEST PAIN, UNSPECIFIED: ICD-10-CM

## 2018-06-25 DIAGNOSIS — Z23 ENCOUNTER FOR IMMUNIZATION: ICD-10-CM

## 2018-06-25 DIAGNOSIS — Z11.1 ENCOUNTER FOR SCREENING FOR RESPIRATORY TUBERCULOSIS: ICD-10-CM

## 2018-06-25 DIAGNOSIS — F32.1 MAJOR DEPRESSIVE DISORDER, SINGLE EPISODE, MODERATE: ICD-10-CM

## 2018-06-28 ENCOUNTER — OUTPATIENT (OUTPATIENT)
Dept: OUTPATIENT SERVICES | Facility: HOSPITAL | Age: 73
LOS: 1 days | Discharge: HOME | End: 2018-06-28

## 2018-06-28 DIAGNOSIS — I82.91 CHRONIC EMBOLISM AND THROMBOSIS OF UNSPECIFIED VEIN: ICD-10-CM

## 2018-06-28 DIAGNOSIS — Z95.0 PRESENCE OF CARDIAC PACEMAKER: Chronic | ICD-10-CM

## 2018-07-02 ENCOUNTER — OUTPATIENT (OUTPATIENT)
Dept: OUTPATIENT SERVICES | Facility: HOSPITAL | Age: 73
LOS: 1 days | Discharge: HOME | End: 2018-07-02

## 2018-07-02 DIAGNOSIS — Z95.0 PRESENCE OF CARDIAC PACEMAKER: Chronic | ICD-10-CM

## 2018-07-02 DIAGNOSIS — I82.91 CHRONIC EMBOLISM AND THROMBOSIS OF UNSPECIFIED VEIN: ICD-10-CM

## 2018-07-09 ENCOUNTER — OUTPATIENT (OUTPATIENT)
Dept: OUTPATIENT SERVICES | Facility: HOSPITAL | Age: 73
LOS: 1 days | Discharge: HOME | End: 2018-07-09

## 2018-07-09 DIAGNOSIS — Z95.0 PRESENCE OF CARDIAC PACEMAKER: Chronic | ICD-10-CM

## 2018-07-09 DIAGNOSIS — I82.91 CHRONIC EMBOLISM AND THROMBOSIS OF UNSPECIFIED VEIN: ICD-10-CM

## 2018-07-16 ENCOUNTER — OUTPATIENT (OUTPATIENT)
Dept: OUTPATIENT SERVICES | Facility: HOSPITAL | Age: 73
LOS: 1 days | Discharge: HOME | End: 2018-07-16

## 2018-07-16 DIAGNOSIS — Z95.0 PRESENCE OF CARDIAC PACEMAKER: Chronic | ICD-10-CM

## 2018-07-16 DIAGNOSIS — I82.91 CHRONIC EMBOLISM AND THROMBOSIS OF UNSPECIFIED VEIN: ICD-10-CM

## 2018-07-23 ENCOUNTER — OUTPATIENT (OUTPATIENT)
Dept: OUTPATIENT SERVICES | Facility: HOSPITAL | Age: 73
LOS: 1 days | Discharge: HOME | End: 2018-07-23

## 2018-07-23 DIAGNOSIS — I82.91 CHRONIC EMBOLISM AND THROMBOSIS OF UNSPECIFIED VEIN: ICD-10-CM

## 2018-07-23 DIAGNOSIS — Z95.0 PRESENCE OF CARDIAC PACEMAKER: Chronic | ICD-10-CM

## 2018-07-24 PROBLEM — I25.10 ATHEROSCLEROTIC HEART DISEASE OF NATIVE CORONARY ARTERY WITHOUT ANGINA PECTORIS: Chronic | Status: ACTIVE | Noted: 2018-05-25

## 2018-07-24 PROBLEM — R00.1 BRADYCARDIA, UNSPECIFIED: Chronic | Status: ACTIVE | Noted: 2018-05-25

## 2018-07-30 ENCOUNTER — OUTPATIENT (OUTPATIENT)
Dept: OUTPATIENT SERVICES | Facility: HOSPITAL | Age: 73
LOS: 1 days | Discharge: HOME | End: 2018-07-30

## 2018-07-30 DIAGNOSIS — I82.91 CHRONIC EMBOLISM AND THROMBOSIS OF UNSPECIFIED VEIN: ICD-10-CM

## 2018-07-30 DIAGNOSIS — Z95.0 PRESENCE OF CARDIAC PACEMAKER: Chronic | ICD-10-CM

## 2018-08-06 ENCOUNTER — OUTPATIENT (OUTPATIENT)
Dept: OUTPATIENT SERVICES | Facility: HOSPITAL | Age: 73
LOS: 1 days | Discharge: HOME | End: 2018-08-06

## 2018-08-06 DIAGNOSIS — I82.890 ACUTE EMBOLISM AND THROMBOSIS OF OTHER SPECIFIED VEINS: ICD-10-CM

## 2018-08-06 DIAGNOSIS — Z95.0 PRESENCE OF CARDIAC PACEMAKER: Chronic | ICD-10-CM

## 2018-08-13 ENCOUNTER — OUTPATIENT (OUTPATIENT)
Dept: OUTPATIENT SERVICES | Facility: HOSPITAL | Age: 73
LOS: 1 days | Discharge: HOME | End: 2018-08-13

## 2018-08-13 DIAGNOSIS — I82.91 CHRONIC EMBOLISM AND THROMBOSIS OF UNSPECIFIED VEIN: ICD-10-CM

## 2018-08-13 DIAGNOSIS — Z95.0 PRESENCE OF CARDIAC PACEMAKER: Chronic | ICD-10-CM

## 2018-08-20 ENCOUNTER — OUTPATIENT (OUTPATIENT)
Dept: OUTPATIENT SERVICES | Facility: HOSPITAL | Age: 73
LOS: 1 days | Discharge: HOME | End: 2018-08-20

## 2018-08-20 DIAGNOSIS — I82.91 CHRONIC EMBOLISM AND THROMBOSIS OF UNSPECIFIED VEIN: ICD-10-CM

## 2018-08-20 DIAGNOSIS — Z95.0 PRESENCE OF CARDIAC PACEMAKER: Chronic | ICD-10-CM

## 2018-08-27 ENCOUNTER — OUTPATIENT (OUTPATIENT)
Dept: OUTPATIENT SERVICES | Facility: HOSPITAL | Age: 73
LOS: 1 days | Discharge: HOME | End: 2018-08-27

## 2018-08-27 DIAGNOSIS — Z95.0 PRESENCE OF CARDIAC PACEMAKER: Chronic | ICD-10-CM

## 2018-08-27 DIAGNOSIS — I82.91 CHRONIC EMBOLISM AND THROMBOSIS OF UNSPECIFIED VEIN: ICD-10-CM

## 2018-09-05 ENCOUNTER — OUTPATIENT (OUTPATIENT)
Dept: OUTPATIENT SERVICES | Facility: HOSPITAL | Age: 73
LOS: 1 days | Discharge: HOME | End: 2018-09-05

## 2018-09-05 DIAGNOSIS — Z95.0 PRESENCE OF CARDIAC PACEMAKER: Chronic | ICD-10-CM

## 2018-09-05 DIAGNOSIS — I82.91 CHRONIC EMBOLISM AND THROMBOSIS OF UNSPECIFIED VEIN: ICD-10-CM

## 2018-09-10 ENCOUNTER — OUTPATIENT (OUTPATIENT)
Dept: OUTPATIENT SERVICES | Facility: HOSPITAL | Age: 73
LOS: 1 days | Discharge: HOME | End: 2018-09-10

## 2018-09-10 DIAGNOSIS — Z95.0 PRESENCE OF CARDIAC PACEMAKER: Chronic | ICD-10-CM

## 2018-09-10 DIAGNOSIS — I82.91 CHRONIC EMBOLISM AND THROMBOSIS OF UNSPECIFIED VEIN: ICD-10-CM

## 2018-09-13 ENCOUNTER — OUTPATIENT (OUTPATIENT)
Dept: OUTPATIENT SERVICES | Facility: HOSPITAL | Age: 73
LOS: 1 days | Discharge: HOME | End: 2018-09-13

## 2018-09-13 DIAGNOSIS — F01.50 VASCULAR DEMENTIA WITHOUT BEHAVIORAL DISTURBANCE: ICD-10-CM

## 2018-09-13 DIAGNOSIS — Z95.0 PRESENCE OF CARDIAC PACEMAKER: Chronic | ICD-10-CM

## 2018-09-17 ENCOUNTER — OUTPATIENT (OUTPATIENT)
Dept: OUTPATIENT SERVICES | Facility: HOSPITAL | Age: 73
LOS: 1 days | Discharge: HOME | End: 2018-09-17

## 2018-09-17 DIAGNOSIS — Z95.0 PRESENCE OF CARDIAC PACEMAKER: Chronic | ICD-10-CM

## 2018-09-17 DIAGNOSIS — I82.91 CHRONIC EMBOLISM AND THROMBOSIS OF UNSPECIFIED VEIN: ICD-10-CM

## 2018-09-24 ENCOUNTER — OUTPATIENT (OUTPATIENT)
Dept: OUTPATIENT SERVICES | Facility: HOSPITAL | Age: 73
LOS: 1 days | Discharge: HOME | End: 2018-09-24

## 2018-09-24 DIAGNOSIS — Z95.0 PRESENCE OF CARDIAC PACEMAKER: Chronic | ICD-10-CM

## 2018-09-24 DIAGNOSIS — I82.91 CHRONIC EMBOLISM AND THROMBOSIS OF UNSPECIFIED VEIN: ICD-10-CM

## 2018-10-01 ENCOUNTER — OUTPATIENT (OUTPATIENT)
Dept: OUTPATIENT SERVICES | Facility: HOSPITAL | Age: 73
LOS: 1 days | Discharge: HOME | End: 2018-10-01

## 2018-10-01 DIAGNOSIS — Z95.0 PRESENCE OF CARDIAC PACEMAKER: Chronic | ICD-10-CM

## 2018-10-01 DIAGNOSIS — F20.89 OTHER SCHIZOPHRENIA: ICD-10-CM

## 2018-10-04 ENCOUNTER — OUTPATIENT (OUTPATIENT)
Dept: OUTPATIENT SERVICES | Facility: HOSPITAL | Age: 73
LOS: 1 days | Discharge: HOME | End: 2018-10-04

## 2018-10-04 DIAGNOSIS — I82.91 CHRONIC EMBOLISM AND THROMBOSIS OF UNSPECIFIED VEIN: ICD-10-CM

## 2018-10-04 DIAGNOSIS — Z95.0 PRESENCE OF CARDIAC PACEMAKER: Chronic | ICD-10-CM

## 2018-10-08 ENCOUNTER — OUTPATIENT (OUTPATIENT)
Dept: OUTPATIENT SERVICES | Facility: HOSPITAL | Age: 73
LOS: 1 days | Discharge: HOME | End: 2018-10-08

## 2018-10-08 DIAGNOSIS — Z95.0 PRESENCE OF CARDIAC PACEMAKER: Chronic | ICD-10-CM

## 2018-10-08 DIAGNOSIS — I82.91 CHRONIC EMBOLISM AND THROMBOSIS OF UNSPECIFIED VEIN: ICD-10-CM

## 2018-10-15 ENCOUNTER — OUTPATIENT (OUTPATIENT)
Dept: OUTPATIENT SERVICES | Facility: HOSPITAL | Age: 73
LOS: 1 days | Discharge: HOME | End: 2018-10-15

## 2018-10-15 DIAGNOSIS — I82.91 CHRONIC EMBOLISM AND THROMBOSIS OF UNSPECIFIED VEIN: ICD-10-CM

## 2018-10-15 DIAGNOSIS — Z95.0 PRESENCE OF CARDIAC PACEMAKER: Chronic | ICD-10-CM

## 2018-10-23 ENCOUNTER — OUTPATIENT (OUTPATIENT)
Dept: OUTPATIENT SERVICES | Facility: HOSPITAL | Age: 73
LOS: 1 days | Discharge: HOME | End: 2018-10-23

## 2018-10-23 DIAGNOSIS — Z95.0 PRESENCE OF CARDIAC PACEMAKER: Chronic | ICD-10-CM

## 2018-10-23 DIAGNOSIS — I82.91 CHRONIC EMBOLISM AND THROMBOSIS OF UNSPECIFIED VEIN: ICD-10-CM

## 2018-10-29 ENCOUNTER — OUTPATIENT (OUTPATIENT)
Dept: OUTPATIENT SERVICES | Facility: HOSPITAL | Age: 73
LOS: 1 days | Discharge: HOME | End: 2018-10-29

## 2018-10-29 DIAGNOSIS — Z95.0 PRESENCE OF CARDIAC PACEMAKER: Chronic | ICD-10-CM

## 2018-10-29 DIAGNOSIS — I82.91 CHRONIC EMBOLISM AND THROMBOSIS OF UNSPECIFIED VEIN: ICD-10-CM

## 2018-11-05 ENCOUNTER — OUTPATIENT (OUTPATIENT)
Dept: OUTPATIENT SERVICES | Facility: HOSPITAL | Age: 73
LOS: 1 days | Discharge: HOME | End: 2018-11-05

## 2018-11-05 DIAGNOSIS — I82.90 ACUTE EMBOLISM AND THROMBOSIS OF UNSPECIFIED VEIN: ICD-10-CM

## 2018-11-05 DIAGNOSIS — Z95.0 PRESENCE OF CARDIAC PACEMAKER: Chronic | ICD-10-CM

## 2018-11-12 ENCOUNTER — OUTPATIENT (OUTPATIENT)
Dept: OUTPATIENT SERVICES | Facility: HOSPITAL | Age: 73
LOS: 1 days | Discharge: HOME | End: 2018-11-12

## 2018-11-12 DIAGNOSIS — I82.91 CHRONIC EMBOLISM AND THROMBOSIS OF UNSPECIFIED VEIN: ICD-10-CM

## 2018-11-12 DIAGNOSIS — Z95.0 PRESENCE OF CARDIAC PACEMAKER: Chronic | ICD-10-CM

## 2018-11-19 ENCOUNTER — OUTPATIENT (OUTPATIENT)
Dept: OUTPATIENT SERVICES | Facility: HOSPITAL | Age: 73
LOS: 1 days | Discharge: HOME | End: 2018-11-19

## 2018-11-19 DIAGNOSIS — I82.91 CHRONIC EMBOLISM AND THROMBOSIS OF UNSPECIFIED VEIN: ICD-10-CM

## 2018-11-19 DIAGNOSIS — Z95.0 PRESENCE OF CARDIAC PACEMAKER: Chronic | ICD-10-CM

## 2018-11-26 ENCOUNTER — OUTPATIENT (OUTPATIENT)
Dept: OUTPATIENT SERVICES | Facility: HOSPITAL | Age: 73
LOS: 1 days | Discharge: HOME | End: 2018-11-26

## 2018-11-26 DIAGNOSIS — Z95.0 PRESENCE OF CARDIAC PACEMAKER: Chronic | ICD-10-CM

## 2018-11-26 DIAGNOSIS — I82.91 CHRONIC EMBOLISM AND THROMBOSIS OF UNSPECIFIED VEIN: ICD-10-CM

## 2018-11-27 ENCOUNTER — OUTPATIENT (OUTPATIENT)
Dept: OUTPATIENT SERVICES | Facility: HOSPITAL | Age: 73
LOS: 1 days | Discharge: HOME | End: 2018-11-27

## 2018-11-27 DIAGNOSIS — Z95.0 PRESENCE OF CARDIAC PACEMAKER: Chronic | ICD-10-CM

## 2018-11-27 DIAGNOSIS — I82.91 CHRONIC EMBOLISM AND THROMBOSIS OF UNSPECIFIED VEIN: ICD-10-CM

## 2018-12-03 ENCOUNTER — OUTPATIENT (OUTPATIENT)
Dept: OUTPATIENT SERVICES | Facility: HOSPITAL | Age: 73
LOS: 1 days | Discharge: HOME | End: 2018-12-03

## 2018-12-03 DIAGNOSIS — Z95.0 PRESENCE OF CARDIAC PACEMAKER: Chronic | ICD-10-CM

## 2018-12-03 DIAGNOSIS — I82.91 CHRONIC EMBOLISM AND THROMBOSIS OF UNSPECIFIED VEIN: ICD-10-CM

## 2018-12-10 ENCOUNTER — OUTPATIENT (OUTPATIENT)
Dept: OUTPATIENT SERVICES | Facility: HOSPITAL | Age: 73
LOS: 1 days | Discharge: HOME | End: 2018-12-10

## 2018-12-10 DIAGNOSIS — Z95.0 PRESENCE OF CARDIAC PACEMAKER: Chronic | ICD-10-CM

## 2018-12-10 DIAGNOSIS — I82.91 CHRONIC EMBOLISM AND THROMBOSIS OF UNSPECIFIED VEIN: ICD-10-CM

## 2018-12-18 ENCOUNTER — OUTPATIENT (OUTPATIENT)
Dept: OUTPATIENT SERVICES | Facility: HOSPITAL | Age: 73
LOS: 1 days | Discharge: HOME | End: 2018-12-18

## 2018-12-18 DIAGNOSIS — Z95.0 PRESENCE OF CARDIAC PACEMAKER: Chronic | ICD-10-CM

## 2018-12-18 DIAGNOSIS — E11.9 TYPE 2 DIABETES MELLITUS WITHOUT COMPLICATIONS: ICD-10-CM

## 2018-12-18 DIAGNOSIS — R07.9 CHEST PAIN, UNSPECIFIED: ICD-10-CM

## 2018-12-18 NOTE — DISCHARGE NOTE ADULT - DO YOU HAVE DIFFICULTY CLIMBING STAIRS
[facility] [place] [facility]        Patient is a 66y old  Male who presents with a chief complaint of Near syncope, weakness, HA, CP (18 Dec 2018 13:19)      HPI:  65 y/o M with PMH of BPH, essential HTN, cholelithiasis, hypercholesterolemia, and unstable angina S/P stent placement presents after an episode of near syncope with weakness, HA, and CP. Pt describes that this morning he stood up suddenly in his home to walk to the bedroom when he suddenly felt dizzy and weakness. Pt stated that he then called out for his girlfriend and sat down on the floor. Pt did not lose consciousness. Pt denies any symptoms prior to the episode and he has never experienced symptoms like this before. Pt notes that 4 years ago he had an episode of syncope at home that was not accompanied by similar symptoms but was never worked up after the episode by a healthcare provider. Pt also notes that he has been experiencing CP and chest heaviness for the past month which he describes as "a weight on my chest" and "a needle stabbing my chest", these episodes last a few minutes and then resolve on their own, multiple episodes per day. During interview pt is currently experiencing chest heaviness. Also notes that he has been experiencing dizziness upon standing for the past few weeks. Pt has also been experiencing intermittent HAs over the past year, which he describes as "pressure and stiffness" on the posterior head and neck, he is experiencing an episode currently. He has never been worked up for them before. Pt is also currently experiencing R lower back pain which he describes as "throbbing". Pt noted he stopped taking his Clopidogrel and ASA 1 month ago after experiencing episodes of epistaxis. Pt is also noncompliant with his Metoprolol, Atorvastatin and Ramipril. He states that he takes them occasionally because he thinks that he is on too many medications. Pt denies fevers, chills, SOB, cough, abdo pain, weight loss, fatigue, N/V/D, sick contacts or recent travel.            *****PAST MEDICAL / Surgical  HISTORY:  PAST MEDICAL & SURGICAL HISTORY:  Cholelithiasis: pending surgery  BPH (benign prostatic hyperplasia)  Hypercholesterolemia  Essential hypertension  No significant past surgical history           *****FAMILY HISTORY:  FAMILY HISTORY:           *****SOCIAL HISTORY:  Alcohol: None  Smoking: None         *****ALLERGIES:   Allergies    No Known Allergies    Intolerances             *****MEDICATIONS: current medication reviewed and documented.   MEDICATIONS  (STANDING):  aspirin enteric coated 81 milliGRAM(s) Oral daily  atorvastatin 80 milliGRAM(s) Oral at bedtime  clopidogrel Tablet 75 milliGRAM(s) Oral daily  influenza   Vaccine 0.5 milliLiter(s) IntraMuscular once  lisinopril 40 milliGRAM(s) Oral daily  metoprolol succinate ER 50 milliGRAM(s) Oral daily  potassium chloride    Tablet ER 40 milliEquivalent(s) Oral every 4 hours  tamsulosin 0.4 milliGRAM(s) Oral at bedtime    MEDICATIONS  (PRN):           *****REVIEW OF SYSTEM:  GEN: no fever, no chills, no pain  RESP: no SOB, no cough, no sputum  CVS: no chest pain, no palpitations, no edema  GI: no abdominal pain, no nausea, no vomiting, no constipation, no diarrhea  : no dysurea, no frequency, no hematurea  Neuro: no headache, no dizziness  PSYCH: no anxiety, no depression  Derm : no itching, no rash         *****VITAL SIGNS:  T(C): 36.4 (18 @ 13:12), Max: 36.9 (18 @ 21:16)  HR: 73 (18 @ 13:12) (73 - 87)  BP: 124/70 (18 @ 13:12) (124/70 - 145/76)  RR: 18 (18 @ 13:12) (18 - 24)  SpO2: 100% (18 @ 13:12) (97% - 100%)  Wt(kg): --           *****PHYSICAL EXAM:     Alert oriented x 3   Attention comprehension are fair. Able to name, repeat, read without any difficulty.   Able to follow 3 step commands.     EOMI fundi not visualized,  VFF to confrontration  No facial asymmetry   Tongue is midline   Palate elevates symmetrically   Moving all 4 ext symmetrically no pronator drift.  Reflexes are symmetric throughout   sensation is grossly symmetric  Gait : not assessed.  B/L down going toes               *****LAB AND IMAGIN.8   5.00  )-----------( 182      ( 18 Dec 2018 06:20 )             51.1               -18    141  |  101  |  16  ----------------------------<  107<H>  3.4<L>   |  25  |  0.90    Ca    9.1      18 Dec 2018 06:20  Mg     2.3         TPro  7.4  /  Alb  4.4  /  TBili  1.0  /  DBili  x   /  AST  26  /  ALT  22  /  AlkPhos  57      PT/INR - ( 17 Dec 2018 10:50 )   PT: 11.1 SEC;   INR: 1.00          PTT - ( 17 Dec 2018 10:50 )  PTT:32.2 SEC            CARDIAC MARKERS ( 17 Dec 2018 16:00 )  x     / x     / 99 u/L / 2.37 ng/mL / x                  Thyroid Stimulating Hormone, Serum: 0.61 uIU/mL (18 @ 06:20)    Hemoglobin A1C, Whole Blood: 5.9: High Risk (prediabetic)    5.7 - 6.4 %  Diabetic, diagnostic           > 6.5 %  ADA diabetic treatment goal    < 7.0 %    HbA1C values may not accurately reflect mean blood glucose  in patients with Hb variants.  Suggest clinical correlation. % (18 @ 06:20)        [All pertinent recent Imaging reports reviewed]         *****A S S E S S M E N T   A N D   P L A N :   65 y/o M with PMH of BPH, essential HTN, cholelithiasis, hypercholesterolemia, and unstable angina S/P stent placement presents after an episode of near syncope with weakness, HA, and CP. Pt describes that this morning he stood up suddenly in his home to walk to the bedroom when he suddenly felt dizzy and weakness. Pt stated that he then called out for his girlfriend and sat down on the floor. Pt did not lose consciousness. Pt denies any symptoms prior to the episode and he has never experienced symptoms like this before. Pt notes that 4 years ago he had an episode of syncope at home that was not accompanied by similar symptoms but was never worked up after the episode by a healthcare provider. Pt also notes that he has been experiencing CP and chest heaviness for the past month which he describes as "a weight on my chest" and "a needle stabbing my chest", these episodes last a few minutes and then resolve on their own, multiple episodes per day. During interview pt is currently experiencing chest heaviness. Also notes that he has been experiencing dizziness upon standing for the past few weeks. Pt has also been experiencing intermittent HAs over the past year, which he describes as "pressure and stiffness" on the posterior head and neck, he is experiencing an episode currently. He has never been worked up for them before. Pt is also currently experiencing R lower back pain which he describes as "throbbing". Pt noted he stopped taking his Clopidogrel and ASA 1 month ago after experiencing episodes of epistaxis. Pt is also noncompliant with his Metoprolol, Atorvastatin and Ramipril. He states that he takes them occasionally because he thinks that he is on too many medications. Pt denies fevers, chills, SOB, cough, abdo pain, weight loss, fatigue, N/V/D, sick contacts or recent travel.    Problem/Recommendations 1: orthostatic presyncope   given multiple comorbidities and poor compliance to medication regimen.   ldl 160   would get mri/mra to r/o vbi.       Problem/Recommendations 2:       ___________________________  Will follow with you.  Thank you,  Aster Dash MD  Diplomate of the American Board of Neurology and Psychiatry.  Diplomate of the American Board of Vascular Neurology.   [facility] ([facility]), [facility]   Ph: [phone]    Differential diagnosis and plan of care discussed with patient after the evaluation.   Advanced care planning options discussed.   Pain assessed and judicious use of narcotics when appropriate was discussed.  Importance of Fall prevention discussed.  Counseling on Smoking and Alcohol cessation was offered when appropriate.  Counseling on Diet, exercise, and medication compliance was done.     62 minutes spent on the total encounter;  more than 50 % of the visit was spent on counseling  and or coordinating care by the attending physician.    Thank you for allowing me to participate in the care of this uday patient. Please do not hesitate to call me if you have any questions.     This and subsequent notes were partially created using voice recognition software and will  inherently be subject to errors including those of syntax and sound alike substitutions which may escape proofreading. In such instances original meaning may be extrapolated by contextual derivation. [facility] [place] [facility]        Patient is a 66y old  Male who presents with a chief complaint of Near syncope, weakness, HA, CP (18 Dec 2018 13:19)      HPI:  65 y/o M with PMH of BPH, essential HTN, cholelithiasis, hypercholesterolemia, and unstable angina S/P stent placement presents after an episode of near syncope with weakness, HA, and CP. Pt describes that this morning he stood up suddenly in his home to walk to the bedroom when he suddenly felt dizzy and weakness. Pt stated that he then called out for his girlfriend and sat down on the floor. Pt did not lose consciousness. Pt denies any symptoms prior to the episode and he has never experienced symptoms like this before. Pt notes that 4 years ago he had an episode of syncope at home that was not accompanied by similar symptoms but was never worked up after the episode by a healthcare provider. Pt also notes that he has been experiencing CP and chest heaviness for the past month which he describes as "a weight on my chest" and "a needle stabbing my chest", these episodes last a few minutes and then resolve on their own, multiple episodes per day. During interview pt is currently experiencing chest heaviness. Also notes that he has been experiencing dizziness upon standing for the past few weeks. Pt has also been experiencing intermittent HAs over the past year, which he describes as "pressure and stiffness" on the posterior head and neck, he is experiencing an episode currently. He has never been worked up for them before. Pt is also currently experiencing R lower back pain which he describes as "throbbing". Pt noted he stopped taking his Clopidogrel and ASA 1 month ago after experiencing episodes of epistaxis. Pt is also noncompliant with his Metoprolol, Atorvastatin and Ramipril. He states that he takes them occasionally because he thinks that he is on too many medications. Pt denies fevers, chills, SOB, cough, abdo pain, weight loss, fatigue, N/V/D, sick contacts or recent travel.            *****PAST MEDICAL / Surgical  HISTORY:  PAST MEDICAL & SURGICAL HISTORY:  Cholelithiasis: pending surgery  BPH (benign prostatic hyperplasia)  Hypercholesterolemia  Essential hypertension  No significant past surgical history           *****FAMILY HISTORY:  FAMILY HISTORY:           *****SOCIAL HISTORY:  Alcohol: None  Smoking: None         *****ALLERGIES:   Allergies    No Known Allergies    Intolerances             *****MEDICATIONS: current medication reviewed and documented.   MEDICATIONS  (STANDING):  aspirin enteric coated 81 milliGRAM(s) Oral daily  atorvastatin 80 milliGRAM(s) Oral at bedtime  clopidogrel Tablet 75 milliGRAM(s) Oral daily  influenza   Vaccine 0.5 milliLiter(s) IntraMuscular once  lisinopril 40 milliGRAM(s) Oral daily  metoprolol succinate ER 50 milliGRAM(s) Oral daily  potassium chloride    Tablet ER 40 milliEquivalent(s) Oral every 4 hours  tamsulosin 0.4 milliGRAM(s) Oral at bedtime    MEDICATIONS  (PRN):           *****REVIEW OF SYSTEM:  GEN: no fever, no chills, no pain  RESP: no SOB, no cough, no sputum  CVS: no chest pain, no palpitations, no edema  GI: no abdominal pain, no nausea, no vomiting, no constipation, no diarrhea  : no dysurea, no frequency, no hematurea  Neuro: no headache, no dizziness  PSYCH: no anxiety, no depression  Derm : no itching, no rash         *****VITAL SIGNS:  T(C): 36.4 (18 @ 13:12), Max: 36.9 (18 @ 21:16)  HR: 73 (18 @ 13:12) (73 - 87)  BP: 124/70 (18 @ 13:12) (124/70 - 145/76)  RR: 18 (18 @ 13:12) (18 - 24)  SpO2: 100% (18 @ 13:12) (97% - 100%)  Wt(kg): --           *****PHYSICAL EXAM:     Alert oriented x 3   Attention comprehension are fair. Able to name, repeat, read without any difficulty.   Able to follow 3 step commands.     EOMI fundi not visualized,  VFF to confrontration  No facial asymmetry   Tongue is midline   Palate elevates symmetrically   Moving all 4 ext symmetrically no pronator drift.  Reflexes are symmetric throughout   sensation is grossly symmetric  Gait : able to tandem.  B/L down going toes               *****LAB AND IMAGIN.8   5.00  )-----------( 182      ( 18 Dec 2018 06:20 )             51.1                   141  |  101  |  16  ----------------------------<  107<H>  3.4<L>   |  25  |  0.90    Ca    9.1      18 Dec 2018 06:20  Mg     2.3         TPro  7.4  /  Alb  4.4  /  TBili  1.0  /  DBili  x   /  AST  26  /  ALT  22  /  AlkPhos  57      PT/INR - ( 17 Dec 2018 10:50 )   PT: 11.1 SEC;   INR: 1.00          PTT - ( 17 Dec 2018 10:50 )  PTT:32.2 SEC            CARDIAC MARKERS ( 17 Dec 2018 16:00 )  x     / x     / 99 u/L / 2.37 ng/mL / x                  Thyroid Stimulating Hormone, Serum: 0.61 uIU/mL (18 @ 06:20)    Hemoglobin A1C, Whole Blood: 5.9: High Risk (prediabetic)    5.7 - 6.4 %  Diabetic, diagnostic           > 6.5 %  ADA diabetic treatment goal    < 7.0 %    HbA1C values may not accurately reflect mean blood glucose  in patients with Hb variants.  Suggest clinical correlation. % (18 @ 06:20)        [All pertinent recent Imaging reports reviewed]         *****A S S E S S M E N T   A N D   P L A N :   65 y/o M with PMH of BPH, essential HTN, cholelithiasis, hypercholesterolemia, and unstable angina S/P stent placement presents after an episode of near syncope with weakness, HA, and CP. Pt describes that this morning he stood up suddenly in his home to walk to the bedroom when he suddenly felt dizzy and weakness. Pt stated that he then called out for his girlfriend and sat down on the floor. Pt did not lose consciousness. Pt denies any symptoms prior to the episode and he has never experienced symptoms like this before. Pt notes that 4 years ago he had an episode of syncope at home that was not accompanied by similar symptoms but was never worked up after the episode by a healthcare provider. Pt also notes that he has been experiencing CP and chest heaviness for the past month which he describes as "a weight on my chest" and "a needle stabbing my chest", these episodes last a few minutes and then resolve on their own, multiple episodes per day. During interview pt is currently experiencing chest heaviness. Also notes that he has been experiencing dizziness upon standing for the past few weeks. Pt has also been experiencing intermittent HAs over the past year, which he describes as "pressure and stiffness" on the posterior head and neck, he is experiencing an episode currently. He has never been worked up for them before. Pt is also currently experiencing R lower back pain which he describes as "throbbing". Pt noted he stopped taking his Clopidogrel and ASA 1 month ago after experiencing episodes of epistaxis. Pt is also noncompliant with his Metoprolol, Atorvastatin and Ramipril. He states that he takes them occasionally because he thinks that he is on too many medications. Pt denies fevers, chills, SOB, cough, abdo pain, weight loss, fatigue, N/V/D, sick contacts or recent travel.    Problem/Recommendations 1: orthostatic presyncope   given multiple comorbidities and poor compliance to medication regimen.   ldl 160 has been off plavix for a few month due to epistaxis.   would get mri/mra to r/o vbi, especially given persistent dizziness. Pt also complains of a posterior headache/neckache which maybe orthostatic headache   would repeat orthostatics.         Problem/Recommendations 2: labile htn defer to medicine for management.      Problem/Recommendations 3: borderline dm   educated pt about risk factors of diabetes.   diet/exercise was recommended.      ___________________________  Will follow with you.  Thank you,  Aster Dash MD  Diplomate of the American Board of Neurology and Psychiatry.  Diplomate of the American Board of Vascular Neurology.   [facility] ([facility]), [facility]   Ph: [phone]    Differential diagnosis and plan of care discussed with patient after the evaluation.   Advanced care planning options discussed.   Pain assessed and judicious use of narcotics when appropriate was discussed.  Importance of Fall prevention discussed.  Counseling on Smoking and Alcohol cessation was offered when appropriate.  Counseling on Diet, exercise, and medication compliance was done.     62 minutes spent on the total encounter;  more than 50 % of the visit was spent on counseling  and or coordinating care by the attending physician.    Thank you for allowing me to participate in the care of this uday patient. Please do not hesitate to call me if you have any questions.     This and subsequent notes were partially created using voice recognition software and will  inherently be subject to errors including those of syntax and sound alike substitutions which may escape proofreading. In such instances original meaning may be extrapolated by contextual derivation. No

## 2018-12-25 ENCOUNTER — OUTPATIENT (OUTPATIENT)
Dept: OUTPATIENT SERVICES | Facility: HOSPITAL | Age: 73
LOS: 1 days | Discharge: HOME | End: 2018-12-25

## 2018-12-25 DIAGNOSIS — I82.91 CHRONIC EMBOLISM AND THROMBOSIS OF UNSPECIFIED VEIN: ICD-10-CM

## 2018-12-25 DIAGNOSIS — Z95.0 PRESENCE OF CARDIAC PACEMAKER: Chronic | ICD-10-CM

## 2018-12-31 ENCOUNTER — OUTPATIENT (OUTPATIENT)
Dept: OUTPATIENT SERVICES | Facility: HOSPITAL | Age: 73
LOS: 1 days | Discharge: HOME | End: 2018-12-31

## 2018-12-31 DIAGNOSIS — Z95.0 PRESENCE OF CARDIAC PACEMAKER: Chronic | ICD-10-CM

## 2018-12-31 DIAGNOSIS — I82.91 CHRONIC EMBOLISM AND THROMBOSIS OF UNSPECIFIED VEIN: ICD-10-CM

## 2019-01-07 ENCOUNTER — OUTPATIENT (OUTPATIENT)
Dept: OUTPATIENT SERVICES | Facility: HOSPITAL | Age: 74
LOS: 1 days | Discharge: HOME | End: 2019-01-07

## 2019-01-07 DIAGNOSIS — Z95.0 PRESENCE OF CARDIAC PACEMAKER: Chronic | ICD-10-CM

## 2019-01-07 DIAGNOSIS — I82.91 CHRONIC EMBOLISM AND THROMBOSIS OF UNSPECIFIED VEIN: ICD-10-CM

## 2019-01-10 ENCOUNTER — OUTPATIENT (OUTPATIENT)
Dept: OUTPATIENT SERVICES | Facility: HOSPITAL | Age: 74
LOS: 1 days | Discharge: HOME | End: 2019-01-10

## 2019-01-10 DIAGNOSIS — E78.2 MIXED HYPERLIPIDEMIA: ICD-10-CM

## 2019-01-10 DIAGNOSIS — Z95.0 PRESENCE OF CARDIAC PACEMAKER: Chronic | ICD-10-CM

## 2019-01-14 ENCOUNTER — OUTPATIENT (OUTPATIENT)
Dept: OUTPATIENT SERVICES | Facility: HOSPITAL | Age: 74
LOS: 1 days | Discharge: HOME | End: 2019-01-14

## 2019-01-14 DIAGNOSIS — I82.91 CHRONIC EMBOLISM AND THROMBOSIS OF UNSPECIFIED VEIN: ICD-10-CM

## 2019-01-14 DIAGNOSIS — Z95.0 PRESENCE OF CARDIAC PACEMAKER: Chronic | ICD-10-CM

## 2019-01-21 ENCOUNTER — OUTPATIENT (OUTPATIENT)
Dept: OUTPATIENT SERVICES | Facility: HOSPITAL | Age: 74
LOS: 1 days | Discharge: HOME | End: 2019-01-21

## 2019-01-21 DIAGNOSIS — I82.91 CHRONIC EMBOLISM AND THROMBOSIS OF UNSPECIFIED VEIN: ICD-10-CM

## 2019-01-21 DIAGNOSIS — Z95.0 PRESENCE OF CARDIAC PACEMAKER: Chronic | ICD-10-CM

## 2019-01-28 ENCOUNTER — OUTPATIENT (OUTPATIENT)
Dept: OUTPATIENT SERVICES | Facility: HOSPITAL | Age: 74
LOS: 1 days | Discharge: HOME | End: 2019-01-28

## 2019-01-28 DIAGNOSIS — Z95.0 PRESENCE OF CARDIAC PACEMAKER: Chronic | ICD-10-CM

## 2019-01-28 DIAGNOSIS — I82.91 CHRONIC EMBOLISM AND THROMBOSIS OF UNSPECIFIED VEIN: ICD-10-CM

## 2019-02-04 ENCOUNTER — OUTPATIENT (OUTPATIENT)
Dept: OUTPATIENT SERVICES | Facility: HOSPITAL | Age: 74
LOS: 1 days | Discharge: HOME | End: 2019-02-04

## 2019-02-04 DIAGNOSIS — Z95.0 PRESENCE OF CARDIAC PACEMAKER: Chronic | ICD-10-CM

## 2019-02-04 DIAGNOSIS — I82.91 CHRONIC EMBOLISM AND THROMBOSIS OF UNSPECIFIED VEIN: ICD-10-CM

## 2019-02-06 ENCOUNTER — OUTPATIENT (OUTPATIENT)
Dept: OUTPATIENT SERVICES | Facility: HOSPITAL | Age: 74
LOS: 1 days | Discharge: HOME | End: 2019-02-06

## 2019-02-06 DIAGNOSIS — Z95.0 PRESENCE OF CARDIAC PACEMAKER: Chronic | ICD-10-CM

## 2019-02-06 DIAGNOSIS — I48.91 UNSPECIFIED ATRIAL FIBRILLATION: ICD-10-CM

## 2019-02-07 ENCOUNTER — OUTPATIENT (OUTPATIENT)
Dept: OUTPATIENT SERVICES | Facility: HOSPITAL | Age: 74
LOS: 1 days | Discharge: HOME | End: 2019-02-07

## 2019-02-07 DIAGNOSIS — Z95.0 PRESENCE OF CARDIAC PACEMAKER: Chronic | ICD-10-CM

## 2019-02-07 DIAGNOSIS — I82.91 CHRONIC EMBOLISM AND THROMBOSIS OF UNSPECIFIED VEIN: ICD-10-CM

## 2019-02-11 ENCOUNTER — OUTPATIENT (OUTPATIENT)
Dept: OUTPATIENT SERVICES | Facility: HOSPITAL | Age: 74
LOS: 1 days | Discharge: HOME | End: 2019-02-11

## 2019-02-11 DIAGNOSIS — Z95.0 PRESENCE OF CARDIAC PACEMAKER: Chronic | ICD-10-CM

## 2019-02-11 DIAGNOSIS — I82.91 CHRONIC EMBOLISM AND THROMBOSIS OF UNSPECIFIED VEIN: ICD-10-CM

## 2019-02-18 ENCOUNTER — OUTPATIENT (OUTPATIENT)
Dept: OUTPATIENT SERVICES | Facility: HOSPITAL | Age: 74
LOS: 1 days | Discharge: HOME | End: 2019-02-18

## 2019-02-18 DIAGNOSIS — Z95.0 PRESENCE OF CARDIAC PACEMAKER: Chronic | ICD-10-CM

## 2019-02-18 DIAGNOSIS — I82.91 CHRONIC EMBOLISM AND THROMBOSIS OF UNSPECIFIED VEIN: ICD-10-CM

## 2019-02-25 ENCOUNTER — OUTPATIENT (OUTPATIENT)
Dept: OUTPATIENT SERVICES | Facility: HOSPITAL | Age: 74
LOS: 1 days | Discharge: HOME | End: 2019-02-25

## 2019-02-25 DIAGNOSIS — Z95.0 PRESENCE OF CARDIAC PACEMAKER: Chronic | ICD-10-CM

## 2019-02-25 DIAGNOSIS — I82.91 CHRONIC EMBOLISM AND THROMBOSIS OF UNSPECIFIED VEIN: ICD-10-CM

## 2019-03-04 ENCOUNTER — OUTPATIENT (OUTPATIENT)
Dept: OUTPATIENT SERVICES | Facility: HOSPITAL | Age: 74
LOS: 1 days | Discharge: HOME | End: 2019-03-04

## 2019-03-04 DIAGNOSIS — I82.91 CHRONIC EMBOLISM AND THROMBOSIS OF UNSPECIFIED VEIN: ICD-10-CM

## 2019-03-04 DIAGNOSIS — Z95.0 PRESENCE OF CARDIAC PACEMAKER: Chronic | ICD-10-CM

## 2019-03-06 ENCOUNTER — OUTPATIENT (OUTPATIENT)
Dept: OUTPATIENT SERVICES | Facility: HOSPITAL | Age: 74
LOS: 1 days | Discharge: HOME | End: 2019-03-06

## 2019-03-06 DIAGNOSIS — I82.91 CHRONIC EMBOLISM AND THROMBOSIS OF UNSPECIFIED VEIN: ICD-10-CM

## 2019-03-06 DIAGNOSIS — Z95.0 PRESENCE OF CARDIAC PACEMAKER: Chronic | ICD-10-CM

## 2019-03-11 ENCOUNTER — OUTPATIENT (OUTPATIENT)
Dept: OUTPATIENT SERVICES | Facility: HOSPITAL | Age: 74
LOS: 1 days | Discharge: HOME | End: 2019-03-11

## 2019-03-11 DIAGNOSIS — Z95.0 PRESENCE OF CARDIAC PACEMAKER: Chronic | ICD-10-CM

## 2019-03-11 DIAGNOSIS — I82.91 CHRONIC EMBOLISM AND THROMBOSIS OF UNSPECIFIED VEIN: ICD-10-CM

## 2019-03-14 ENCOUNTER — OUTPATIENT (OUTPATIENT)
Dept: OUTPATIENT SERVICES | Facility: HOSPITAL | Age: 74
LOS: 1 days | Discharge: HOME | End: 2019-03-14

## 2019-03-14 DIAGNOSIS — I82.91 CHRONIC EMBOLISM AND THROMBOSIS OF UNSPECIFIED VEIN: ICD-10-CM

## 2019-03-14 DIAGNOSIS — Z95.0 PRESENCE OF CARDIAC PACEMAKER: Chronic | ICD-10-CM

## 2019-03-18 ENCOUNTER — OUTPATIENT (OUTPATIENT)
Dept: OUTPATIENT SERVICES | Facility: HOSPITAL | Age: 74
LOS: 1 days | Discharge: HOME | End: 2019-03-18

## 2019-03-18 DIAGNOSIS — Z95.0 PRESENCE OF CARDIAC PACEMAKER: Chronic | ICD-10-CM

## 2019-03-19 ENCOUNTER — OUTPATIENT (OUTPATIENT)
Dept: OUTPATIENT SERVICES | Facility: HOSPITAL | Age: 74
LOS: 1 days | Discharge: HOME | End: 2019-03-19

## 2019-03-19 DIAGNOSIS — Z95.0 PRESENCE OF CARDIAC PACEMAKER: Chronic | ICD-10-CM

## 2019-03-19 DIAGNOSIS — I82.291: ICD-10-CM

## 2019-03-25 ENCOUNTER — OUTPATIENT (OUTPATIENT)
Dept: OUTPATIENT SERVICES | Facility: HOSPITAL | Age: 74
LOS: 1 days | Discharge: HOME | End: 2019-03-25

## 2019-03-25 DIAGNOSIS — I82.91 CHRONIC EMBOLISM AND THROMBOSIS OF UNSPECIFIED VEIN: ICD-10-CM

## 2019-03-25 DIAGNOSIS — Z95.0 PRESENCE OF CARDIAC PACEMAKER: Chronic | ICD-10-CM

## 2019-03-27 ENCOUNTER — OUTPATIENT (OUTPATIENT)
Dept: OUTPATIENT SERVICES | Facility: HOSPITAL | Age: 74
LOS: 1 days | Discharge: HOME | End: 2019-03-27

## 2019-03-27 DIAGNOSIS — I82.91 CHRONIC EMBOLISM AND THROMBOSIS OF UNSPECIFIED VEIN: ICD-10-CM

## 2019-03-27 DIAGNOSIS — Z95.0 PRESENCE OF CARDIAC PACEMAKER: Chronic | ICD-10-CM

## 2019-03-29 ENCOUNTER — OUTPATIENT (OUTPATIENT)
Dept: OUTPATIENT SERVICES | Facility: HOSPITAL | Age: 74
LOS: 1 days | Discharge: HOME | End: 2019-03-29

## 2019-03-29 DIAGNOSIS — I82.91 CHRONIC EMBOLISM AND THROMBOSIS OF UNSPECIFIED VEIN: ICD-10-CM

## 2019-03-29 DIAGNOSIS — Z95.0 PRESENCE OF CARDIAC PACEMAKER: Chronic | ICD-10-CM

## 2019-04-01 ENCOUNTER — OUTPATIENT (OUTPATIENT)
Dept: OUTPATIENT SERVICES | Facility: HOSPITAL | Age: 74
LOS: 1 days | Discharge: HOME | End: 2019-04-01

## 2019-04-01 DIAGNOSIS — Z79.01 LONG TERM (CURRENT) USE OF ANTICOAGULANTS: ICD-10-CM

## 2019-04-01 DIAGNOSIS — Z95.0 PRESENCE OF CARDIAC PACEMAKER: Chronic | ICD-10-CM

## 2019-04-08 ENCOUNTER — OUTPATIENT (OUTPATIENT)
Dept: OUTPATIENT SERVICES | Facility: HOSPITAL | Age: 74
LOS: 1 days | Discharge: HOME | End: 2019-04-08

## 2019-04-08 DIAGNOSIS — I82.91 CHRONIC EMBOLISM AND THROMBOSIS OF UNSPECIFIED VEIN: ICD-10-CM

## 2019-04-08 DIAGNOSIS — Z95.0 PRESENCE OF CARDIAC PACEMAKER: Chronic | ICD-10-CM

## 2019-04-15 ENCOUNTER — OUTPATIENT (OUTPATIENT)
Dept: OUTPATIENT SERVICES | Facility: HOSPITAL | Age: 74
LOS: 1 days | Discharge: HOME | End: 2019-04-15

## 2019-04-15 DIAGNOSIS — Z79.01 LONG TERM (CURRENT) USE OF ANTICOAGULANTS: ICD-10-CM

## 2019-04-15 DIAGNOSIS — Z95.0 PRESENCE OF CARDIAC PACEMAKER: Chronic | ICD-10-CM

## 2019-04-17 ENCOUNTER — OUTPATIENT (OUTPATIENT)
Dept: OUTPATIENT SERVICES | Facility: HOSPITAL | Age: 74
LOS: 1 days | Discharge: HOME | End: 2019-04-17

## 2019-04-17 DIAGNOSIS — I82.91 CHRONIC EMBOLISM AND THROMBOSIS OF UNSPECIFIED VEIN: ICD-10-CM

## 2019-04-17 DIAGNOSIS — Z95.0 PRESENCE OF CARDIAC PACEMAKER: Chronic | ICD-10-CM

## 2019-04-22 ENCOUNTER — OUTPATIENT (OUTPATIENT)
Dept: OUTPATIENT SERVICES | Facility: HOSPITAL | Age: 74
LOS: 1 days | Discharge: HOME | End: 2019-04-22

## 2019-04-22 DIAGNOSIS — Z95.0 PRESENCE OF CARDIAC PACEMAKER: Chronic | ICD-10-CM

## 2019-04-22 DIAGNOSIS — Z79.01 LONG TERM (CURRENT) USE OF ANTICOAGULANTS: ICD-10-CM

## 2019-04-29 ENCOUNTER — OUTPATIENT (OUTPATIENT)
Dept: OUTPATIENT SERVICES | Facility: HOSPITAL | Age: 74
LOS: 1 days | Discharge: HOME | End: 2019-04-29

## 2019-04-29 DIAGNOSIS — D64.9 ANEMIA, UNSPECIFIED: ICD-10-CM

## 2019-04-29 DIAGNOSIS — Z95.0 PRESENCE OF CARDIAC PACEMAKER: Chronic | ICD-10-CM

## 2019-05-06 ENCOUNTER — OUTPATIENT (OUTPATIENT)
Dept: OUTPATIENT SERVICES | Facility: HOSPITAL | Age: 74
LOS: 1 days | Discharge: HOME | End: 2019-05-06

## 2019-05-06 DIAGNOSIS — Z95.0 PRESENCE OF CARDIAC PACEMAKER: Chronic | ICD-10-CM

## 2019-05-06 DIAGNOSIS — Z86.711 PERSONAL HISTORY OF PULMONARY EMBOLISM: ICD-10-CM

## 2019-05-07 ENCOUNTER — OUTPATIENT (OUTPATIENT)
Dept: OUTPATIENT SERVICES | Facility: HOSPITAL | Age: 74
LOS: 1 days | Discharge: HOME | End: 2019-05-07

## 2019-05-07 DIAGNOSIS — Z86.711 PERSONAL HISTORY OF PULMONARY EMBOLISM: ICD-10-CM

## 2019-05-07 DIAGNOSIS — Z95.0 PRESENCE OF CARDIAC PACEMAKER: Chronic | ICD-10-CM

## 2019-05-14 ENCOUNTER — OUTPATIENT (OUTPATIENT)
Dept: OUTPATIENT SERVICES | Facility: HOSPITAL | Age: 74
LOS: 1 days | Discharge: HOME | End: 2019-05-14

## 2019-05-14 DIAGNOSIS — Z95.0 PRESENCE OF CARDIAC PACEMAKER: Chronic | ICD-10-CM

## 2019-05-14 DIAGNOSIS — Z86.711 PERSONAL HISTORY OF PULMONARY EMBOLISM: ICD-10-CM

## 2019-05-17 ENCOUNTER — OUTPATIENT (OUTPATIENT)
Dept: OUTPATIENT SERVICES | Facility: HOSPITAL | Age: 74
LOS: 1 days | Discharge: HOME | End: 2019-05-17

## 2019-05-17 DIAGNOSIS — Z95.0 PRESENCE OF CARDIAC PACEMAKER: Chronic | ICD-10-CM

## 2019-05-17 DIAGNOSIS — I82.91 CHRONIC EMBOLISM AND THROMBOSIS OF UNSPECIFIED VEIN: ICD-10-CM

## 2019-05-21 ENCOUNTER — OUTPATIENT (OUTPATIENT)
Dept: OUTPATIENT SERVICES | Facility: HOSPITAL | Age: 74
LOS: 1 days | Discharge: HOME | End: 2019-05-21

## 2019-05-21 DIAGNOSIS — Z95.0 PRESENCE OF CARDIAC PACEMAKER: Chronic | ICD-10-CM

## 2019-05-21 DIAGNOSIS — Z79.01 LONG TERM (CURRENT) USE OF ANTICOAGULANTS: ICD-10-CM

## 2019-05-27 ENCOUNTER — OUTPATIENT (OUTPATIENT)
Dept: OUTPATIENT SERVICES | Facility: HOSPITAL | Age: 74
LOS: 1 days | Discharge: HOME | End: 2019-05-27

## 2019-05-27 DIAGNOSIS — Z79.01 LONG TERM (CURRENT) USE OF ANTICOAGULANTS: ICD-10-CM

## 2019-05-27 DIAGNOSIS — Z95.0 PRESENCE OF CARDIAC PACEMAKER: Chronic | ICD-10-CM

## 2019-05-29 ENCOUNTER — OUTPATIENT (OUTPATIENT)
Dept: OUTPATIENT SERVICES | Facility: HOSPITAL | Age: 74
LOS: 1 days | Discharge: HOME | End: 2019-05-29

## 2019-05-29 DIAGNOSIS — Z95.0 PRESENCE OF CARDIAC PACEMAKER: Chronic | ICD-10-CM

## 2019-05-29 DIAGNOSIS — I82.91 CHRONIC EMBOLISM AND THROMBOSIS OF UNSPECIFIED VEIN: ICD-10-CM

## 2019-06-03 ENCOUNTER — OUTPATIENT (OUTPATIENT)
Dept: OUTPATIENT SERVICES | Facility: HOSPITAL | Age: 74
LOS: 1 days | Discharge: HOME | End: 2019-06-03

## 2019-06-03 DIAGNOSIS — Z79.01 LONG TERM (CURRENT) USE OF ANTICOAGULANTS: ICD-10-CM

## 2019-06-03 DIAGNOSIS — Z95.0 PRESENCE OF CARDIAC PACEMAKER: Chronic | ICD-10-CM

## 2019-06-10 ENCOUNTER — OUTPATIENT (OUTPATIENT)
Dept: OUTPATIENT SERVICES | Facility: HOSPITAL | Age: 74
LOS: 1 days | Discharge: HOME | End: 2019-06-10

## 2019-06-10 DIAGNOSIS — Z79.01 LONG TERM (CURRENT) USE OF ANTICOAGULANTS: ICD-10-CM

## 2019-06-10 DIAGNOSIS — Z95.0 PRESENCE OF CARDIAC PACEMAKER: Chronic | ICD-10-CM

## 2019-06-17 ENCOUNTER — OUTPATIENT (OUTPATIENT)
Dept: OUTPATIENT SERVICES | Facility: HOSPITAL | Age: 74
LOS: 1 days | Discharge: HOME | End: 2019-06-17

## 2019-06-17 DIAGNOSIS — Z95.0 PRESENCE OF CARDIAC PACEMAKER: Chronic | ICD-10-CM

## 2019-06-17 DIAGNOSIS — Z79.01 LONG TERM (CURRENT) USE OF ANTICOAGULANTS: ICD-10-CM

## 2019-06-24 ENCOUNTER — OUTPATIENT (OUTPATIENT)
Dept: OUTPATIENT SERVICES | Facility: HOSPITAL | Age: 74
LOS: 1 days | Discharge: HOME | End: 2019-06-24

## 2019-06-24 DIAGNOSIS — Z79.01 LONG TERM (CURRENT) USE OF ANTICOAGULANTS: ICD-10-CM

## 2019-06-24 DIAGNOSIS — Z95.0 PRESENCE OF CARDIAC PACEMAKER: Chronic | ICD-10-CM

## 2019-06-25 ENCOUNTER — OUTPATIENT (OUTPATIENT)
Dept: OUTPATIENT SERVICES | Facility: HOSPITAL | Age: 74
LOS: 1 days | Discharge: HOME | End: 2019-06-25

## 2019-06-25 DIAGNOSIS — Z79.01 LONG TERM (CURRENT) USE OF ANTICOAGULANTS: ICD-10-CM

## 2019-06-25 DIAGNOSIS — Z95.0 PRESENCE OF CARDIAC PACEMAKER: Chronic | ICD-10-CM

## 2019-06-27 ENCOUNTER — OUTPATIENT (OUTPATIENT)
Dept: OUTPATIENT SERVICES | Facility: HOSPITAL | Age: 74
LOS: 1 days | Discharge: HOME | End: 2019-06-27

## 2019-06-27 DIAGNOSIS — I82.91 CHRONIC EMBOLISM AND THROMBOSIS OF UNSPECIFIED VEIN: ICD-10-CM

## 2019-06-27 DIAGNOSIS — Z95.0 PRESENCE OF CARDIAC PACEMAKER: Chronic | ICD-10-CM

## 2019-06-28 ENCOUNTER — INPATIENT (INPATIENT)
Facility: HOSPITAL | Age: 74
LOS: 2 days | Discharge: NURSING, REHAB OR LONG-TERM CARE | End: 2019-07-01
Attending: INTERNAL MEDICINE | Admitting: INTERNAL MEDICINE
Payer: MEDICARE

## 2019-06-28 ENCOUNTER — APPOINTMENT (OUTPATIENT)
Dept: CARDIOLOGY | Facility: CLINIC | Age: 74
End: 2019-06-28
Payer: MEDICARE

## 2019-06-28 VITALS
DIASTOLIC BLOOD PRESSURE: 60 MMHG | OXYGEN SATURATION: 97 % | SYSTOLIC BLOOD PRESSURE: 113 MMHG | RESPIRATION RATE: 17 BRPM | TEMPERATURE: 98 F | HEART RATE: 57 BPM

## 2019-06-28 VITALS
HEART RATE: 92 BPM | DIASTOLIC BLOOD PRESSURE: 66 MMHG | BODY MASS INDEX: 30.62 KG/M2 | WEIGHT: 184 LBS | SYSTOLIC BLOOD PRESSURE: 93 MMHG

## 2019-06-28 DIAGNOSIS — Z95.0 PRESENCE OF CARDIAC PACEMAKER: Chronic | ICD-10-CM

## 2019-06-28 LAB
ALBUMIN SERPL ELPH-MCNC: 4.1 G/DL — SIGNIFICANT CHANGE UP (ref 3.5–5.2)
ALP SERPL-CCNC: 65 U/L — SIGNIFICANT CHANGE UP (ref 30–115)
ALT FLD-CCNC: 13 U/L — SIGNIFICANT CHANGE UP (ref 0–41)
ANION GAP SERPL CALC-SCNC: 11 MMOL/L — SIGNIFICANT CHANGE UP (ref 7–14)
APTT BLD: 29.5 SEC — SIGNIFICANT CHANGE UP (ref 27–39.2)
AST SERPL-CCNC: 19 U/L — SIGNIFICANT CHANGE UP (ref 0–41)
BASOPHILS # BLD AUTO: 0.07 K/UL — SIGNIFICANT CHANGE UP (ref 0–0.2)
BASOPHILS NFR BLD AUTO: 1.1 % — HIGH (ref 0–1)
BILIRUB SERPL-MCNC: 0.5 MG/DL — SIGNIFICANT CHANGE UP (ref 0.2–1.2)
BUN SERPL-MCNC: 19 MG/DL — SIGNIFICANT CHANGE UP (ref 10–20)
CALCIUM SERPL-MCNC: 9 MG/DL — SIGNIFICANT CHANGE UP (ref 8.5–10.1)
CHLORIDE SERPL-SCNC: 105 MMOL/L — SIGNIFICANT CHANGE UP (ref 98–110)
CO2 SERPL-SCNC: 29 MMOL/L — SIGNIFICANT CHANGE UP (ref 17–32)
CREAT SERPL-MCNC: 0.9 MG/DL — SIGNIFICANT CHANGE UP (ref 0.7–1.5)
EOSINOPHIL # BLD AUTO: 0.2 K/UL — SIGNIFICANT CHANGE UP (ref 0–0.7)
EOSINOPHIL NFR BLD AUTO: 3.1 % — SIGNIFICANT CHANGE UP (ref 0–8)
GLUCOSE SERPL-MCNC: 94 MG/DL — SIGNIFICANT CHANGE UP (ref 70–99)
HCT VFR BLD CALC: 39 % — SIGNIFICANT CHANGE UP (ref 37–47)
HGB BLD-MCNC: 12 G/DL — SIGNIFICANT CHANGE UP (ref 12–16)
IMM GRANULOCYTES NFR BLD AUTO: 0.2 % — SIGNIFICANT CHANGE UP (ref 0.1–0.3)
INR BLD: 1.6 RATIO — HIGH (ref 0.65–1.3)
LYMPHOCYTES # BLD AUTO: 2.21 K/UL — SIGNIFICANT CHANGE UP (ref 1.2–3.4)
LYMPHOCYTES # BLD AUTO: 33.9 % — SIGNIFICANT CHANGE UP (ref 20.5–51.1)
MAGNESIUM SERPL-MCNC: 2.3 MG/DL — SIGNIFICANT CHANGE UP (ref 1.8–2.4)
MCHC RBC-ENTMCNC: 23.7 PG — LOW (ref 27–31)
MCHC RBC-ENTMCNC: 30.8 G/DL — LOW (ref 32–37)
MCV RBC AUTO: 77.1 FL — LOW (ref 81–99)
MONOCYTES # BLD AUTO: 0.38 K/UL — SIGNIFICANT CHANGE UP (ref 0.1–0.6)
MONOCYTES NFR BLD AUTO: 5.8 % — SIGNIFICANT CHANGE UP (ref 1.7–9.3)
NEUTROPHILS # BLD AUTO: 3.64 K/UL — SIGNIFICANT CHANGE UP (ref 1.4–6.5)
NEUTROPHILS NFR BLD AUTO: 55.9 % — SIGNIFICANT CHANGE UP (ref 42.2–75.2)
NRBC # BLD: 0 /100 WBCS — SIGNIFICANT CHANGE UP (ref 0–0)
PHOSPHATE SERPL-MCNC: 3.8 MG/DL — SIGNIFICANT CHANGE UP (ref 2.1–4.9)
PLATELET # BLD AUTO: 222 K/UL — SIGNIFICANT CHANGE UP (ref 130–400)
POTASSIUM SERPL-MCNC: 4.6 MMOL/L — SIGNIFICANT CHANGE UP (ref 3.5–5)
POTASSIUM SERPL-SCNC: 4.6 MMOL/L — SIGNIFICANT CHANGE UP (ref 3.5–5)
PROT SERPL-MCNC: 6.9 G/DL — SIGNIFICANT CHANGE UP (ref 6–8)
PROTHROM AB SERPL-ACNC: 18.3 SEC — HIGH (ref 9.95–12.87)
RBC # BLD: 5.06 M/UL — SIGNIFICANT CHANGE UP (ref 4.2–5.4)
RBC # FLD: 15.9 % — HIGH (ref 11.5–14.5)
SODIUM SERPL-SCNC: 145 MMOL/L — SIGNIFICANT CHANGE UP (ref 135–146)
WBC # BLD: 6.51 K/UL — SIGNIFICANT CHANGE UP (ref 4.8–10.8)
WBC # FLD AUTO: 6.51 K/UL — SIGNIFICANT CHANGE UP (ref 4.8–10.8)

## 2019-06-28 PROCEDURE — 99215 OFFICE O/P EST HI 40 MIN: CPT

## 2019-06-28 PROCEDURE — 36000 PLACE NEEDLE IN VEIN: CPT

## 2019-06-28 PROCEDURE — 93010 ELECTROCARDIOGRAM REPORT: CPT

## 2019-06-28 PROCEDURE — 99285 EMERGENCY DEPT VISIT HI MDM: CPT | Mod: 25

## 2019-06-28 PROCEDURE — 71045 X-RAY EXAM CHEST 1 VIEW: CPT | Mod: 26

## 2019-06-28 RX ORDER — DOCUSATE SODIUM 100 MG
1 CAPSULE ORAL
Qty: 0 | Refills: 0 | DISCHARGE

## 2019-06-28 RX ORDER — SENNA PLUS 8.6 MG/1
1 TABLET ORAL
Qty: 0 | Refills: 0 | DISCHARGE

## 2019-06-28 RX ORDER — ERGOCALCIFEROL 1.25 MG/1
1 CAPSULE ORAL
Qty: 0 | Refills: 0 | DISCHARGE

## 2019-06-28 RX ORDER — FAMOTIDINE 10 MG/ML
1 INJECTION INTRAVENOUS
Qty: 0 | Refills: 0 | DISCHARGE

## 2019-06-28 RX ORDER — LISINOPRIL 2.5 MG/1
2.5 TABLET ORAL DAILY
Refills: 0 | Status: DISCONTINUED | OUTPATIENT
Start: 2019-06-28 | End: 2019-07-01

## 2019-06-28 RX ORDER — MIRTAZAPINE 45 MG/1
22.5 TABLET, ORALLY DISINTEGRATING ORAL
Qty: 0 | Refills: 0 | DISCHARGE

## 2019-06-28 RX ORDER — LACTULOSE 10 G/15ML
20 SOLUTION ORAL DAILY
Refills: 0 | Status: DISCONTINUED | OUTPATIENT
Start: 2019-06-28 | End: 2019-07-01

## 2019-06-28 RX ORDER — POTASSIUM CHLORIDE 20 MEQ
1 PACKET (EA) ORAL
Qty: 0 | Refills: 0 | DISCHARGE

## 2019-06-28 RX ORDER — POTASSIUM CHLORIDE 20 MEQ
10 PACKET (EA) ORAL DAILY
Refills: 0 | Status: DISCONTINUED | OUTPATIENT
Start: 2019-06-28 | End: 2019-07-01

## 2019-06-28 RX ORDER — WARFARIN SODIUM 2.5 MG/1
1 TABLET ORAL
Qty: 0 | Refills: 0 | DISCHARGE

## 2019-06-28 RX ORDER — MIRTAZAPINE 45 MG/1
15 TABLET, ORALLY DISINTEGRATING ORAL AT BEDTIME
Refills: 0 | Status: DISCONTINUED | OUTPATIENT
Start: 2019-06-28 | End: 2019-07-01

## 2019-06-28 RX ORDER — SIMVASTATIN 20 MG/1
10 TABLET, FILM COATED ORAL AT BEDTIME
Refills: 0 | Status: DISCONTINUED | OUTPATIENT
Start: 2019-06-28 | End: 2019-07-01

## 2019-06-28 RX ORDER — FUROSEMIDE 40 MG
40 TABLET ORAL
Refills: 0 | Status: DISCONTINUED | OUTPATIENT
Start: 2019-06-28 | End: 2019-06-28

## 2019-06-28 RX ORDER — FUROSEMIDE 40 MG
40 TABLET ORAL DAILY
Refills: 0 | Status: DISCONTINUED | OUTPATIENT
Start: 2019-06-28 | End: 2019-07-01

## 2019-06-28 RX ORDER — DOCUSATE SODIUM 100 MG
100 CAPSULE ORAL
Refills: 0 | Status: DISCONTINUED | OUTPATIENT
Start: 2019-06-28 | End: 2019-07-01

## 2019-06-28 RX ORDER — SENNA PLUS 8.6 MG/1
2 TABLET ORAL AT BEDTIME
Refills: 0 | Status: DISCONTINUED | OUTPATIENT
Start: 2019-06-28 | End: 2019-07-01

## 2019-06-28 RX ORDER — LACTULOSE 10 G/15ML
30 SOLUTION ORAL
Qty: 0 | Refills: 0 | DISCHARGE

## 2019-06-28 RX ORDER — ASPIRIN/CALCIUM CARB/MAGNESIUM 324 MG
81 TABLET ORAL DAILY
Refills: 0 | Status: DISCONTINUED | OUTPATIENT
Start: 2019-06-28 | End: 2019-06-28

## 2019-06-28 RX ORDER — CHLORHEXIDINE GLUCONATE 213 G/1000ML
1 SOLUTION TOPICAL
Refills: 0 | Status: DISCONTINUED | OUTPATIENT
Start: 2019-06-28 | End: 2019-07-01

## 2019-06-28 RX ORDER — FAMOTIDINE 10 MG/ML
20 INJECTION INTRAVENOUS AT BEDTIME
Refills: 0 | Status: DISCONTINUED | OUTPATIENT
Start: 2019-06-28 | End: 2019-07-01

## 2019-06-28 RX ORDER — CHOLECALCIFEROL (VITAMIN D3) 125 MCG
50000 CAPSULE ORAL
Qty: 0 | Refills: 0 | DISCHARGE

## 2019-06-28 RX ADMIN — Medication 81 MILLIGRAM(S): at 22:55

## 2019-06-28 RX ADMIN — SIMVASTATIN 10 MILLIGRAM(S): 20 TABLET, FILM COATED ORAL at 22:55

## 2019-06-28 NOTE — PROCEDURE
[NSR] : normal sinus rhythm [No] : not [Longevity: ___ months] : The estimated remaining battery life is [unfilled] months [VVI] : VVI [Pacemaker] : pacemaker [Programmed for Longevity] : output reprogrammed for improved battery longevity [Counters Reset] : the counters were reset [Pace ___ %] : Pace [unfilled]% [Threshold Testing Performed] : Threshold testing was not performed [de-identified] : [facility] [de-identified] : W251 [de-identified] : Battery at EOL.Magnet rate 85.  Needs change out. [de-identified] : 50

## 2019-06-28 NOTE — H&P ADULT - NSHPLABSRESULTS_GEN_ALL_CORE
12.0   6.51  )-----------( 222      ( 28 Jun 2019 18:34 )             39.0     06-28    145  |  105  |  19  ----------------------------<  94  4.6   |  29  |  0.9    Ca    9.0      28 Jun 2019 18:34  Phos  3.8     06-28  Mg     2.3     06-28    TPro  6.9  /  Alb  4.1  /  TBili  0.5  /  DBili  x   /  AST  19  /  ALT  13  /  AlkPhos  65  06-28        PT/INR - ( 28 Jun 2019 18:38 )   PT: 18.30 sec;   INR: 1.60 ratio         PTT - ( 28 Jun 2019 18:38 )  PTT:29.5 sec

## 2019-06-28 NOTE — H&P ADULT - HISTORY OF PRESENT ILLNESS
This is a 73 year old female with past medical history of CAD, Rheumatoid Arthritis, Hypertension, systolic heart failure (EF unknown) and DVT on Coumadin who was sent in for Pacemaker at end of life. Patient has a pacemaker for a few years due to a cardiac arrythmia that she could not further describe. Patient is followed by Dr. Wick. She was scheduled to see him and found to have a pacemaker battery that has . She reported no acute complaints and is completely asymptomatic. She is to be followed by EPS in the morning for generator change. The patient at baseline is a long term resident at [facility]; where she has lived for the past five years. She gets around via wheelchair.     In the ED vitals: T36.7, HR 57, /54, RR 18, O2 Sat 98% on Room Air

## 2019-06-28 NOTE — H&P ADULT - NSHPPHYSICALEXAM_GEN_ALL_CORE
T(C): 36.7 (06-28-19 @ 21:10), Max: 36.8 (06-28-19 @ 15:57)  HR: 57 (06-28-19 @ 21:10) (57 - 57)  BP: 113/54 (06-28-19 @ 21:10) (113/54 - 113/60)  RR: 18 (06-28-19 @ 21:10) (17 - 18)  SpO2: 98% (06-28-19 @ 21:10) (97% - 98%)    PHYSICAL EXAM:  GENERAL: NAD, well-developed  HEAD:  Atraumatic, Normocephalic  EYES: EOMI, PERRLA, conjunctiva and sclera clear  ENT: No nasal obstruction or discharge. No tonsillar exudate, swelling or erythema.  NECK: Supple, No JVD  CHEST/LUNG: Clear to auscultation bilaterally; No wheeze  HEART: Regular rate and rhythm; No murmurs, rubs, or gallops  ABDOMEN: Soft, Nontender, Nondistended; Bowel sounds present  EXTREMITIES:  2+ Peripheral Pulses, No clubbing, cyanosis, or edema  PSYCH: AAOx3  NEUROLOGY: non-focal  SKIN: No rashes or lesions

## 2019-06-28 NOTE — ASSESSMENT
[FreeTextEntry1] : Patient device is reached end of life. I am unable to integrate the device. Therefore I strongly recommend patient to go to emergency room and undergo generator change on Monday. Patient also will require Coumadin level to be checked.\par \par I have explained the risks and benefits of the procedure to the patient.  There is approximately 1% chance of any major cardiovascular complication to occur. Complications include, but are not limited to infection, bleeding, damage to the vessels, pneumothorax, stroke, death and heart attack.  The patient understands the risk and would like to proceed with the procedure. Patient indicated that all of his questions were answered to his satisfaction and verbalized understanding.\par

## 2019-06-28 NOTE — H&P ADULT - ASSESSMENT
This is a 73 year old female with past medical history of CAD, Rheumatoid Arthritis, Hypertension, systolic heart failure (EF unknown) and h/o DVT who was sent in for Pacemaker at end of life.    #Pacemaker Battery at End of life  - EPS consulted for generator change  - Will hold on any anticoagulation for now in case of plans for generator change in the AM    #Hypertension:  - Continue on Lisinopril 2.5mg Daily, Lasix 40mg PO q12    #H/o Systolic Heart Failure  - No TTE in system but documented history  - Continue on Lasix 40mg PO q12  - Continue on Lisinopril 2.5mg   - Will hold off on any AV louann blocking agents for now given patient's generator at end of life    #Rheumatoid Arthritis  - Stable; outpatient follow up     Activity: As tolerated  Diet: DASH  DVT ppx: Sequentials for now  GI ppx: Not indicated  Code Status: Full Code  DISPO: Pending EPS This is a 73 year old female with past medical history of CAD, Rheumatoid Arthritis, Hypertension, systolic heart failure (EF unknown) and h/o DVT who was sent in for Pacemaker at end of life.    #Pacemaker Battery at End of life  - EPS consulted for generator change  - Will hold on any anticoagulation for now in case of plans for generator change in the AM  - Holding off on any anticoagulation for tonight pending EPS plan for generator change    #Hypertension:  - Continue on Lisinopril 2.5mg Daily, Lasix 40mg PO Daily    #H/o Systolic Heart Failure  - No TTE in system but documented history; TTE ordered  - Continue on Lasix 40mg PO daily  - Continue on Lisinopril 2.5mg daily    - Will hold off on any AV louann blocking agents for now given patient's generator at end of life    #H/o CAD  - Holding ASA 81mg for now pending EPS plan    #Rheumatoid Arthritis  - Stable; outpatient follow up     #Anxiety and insomnia   - On Buspar 10mg q12 and remron 15mg (22.5 documented at [facility])    #Chronic DVT  - Reportedly on Coumadin 10mg at nursing facility  - Will hold off on Coumadin or any anticoagulation for tonight pending EPS plan for surgery    #Constipation  - Continue on colace, senna, and lactulose that patient is taking at facility    #Hypokalemia  - Continue on Potassium Chloride 10meq daily     #Suspected Vitamin D deficiency  - On Vitamin D3 50,000 units daily.     Activity: As tolerated  Diet: DASH  DVT ppx: will hold off on DVT ppx pending EPS eval and plans   GI ppx: Not indicated  Code Status: Full Code  DISPO: Pending EPS     Med rec confirmed with [facility]

## 2019-06-28 NOTE — ED ADULT NURSE NOTE - OBJECTIVE STATEMENT
Patient BIBA form [facility] for bradycardia, hypotension, and pacemaker malfunction. Patient denies any pain at this time. Uses wheelchair for ambulation. Hx of cad and arthritis.

## 2019-06-28 NOTE — H&P ADULT - NSHPSOCIALHISTORY_GEN_ALL_CORE
Non smoker, no alcohol, no recreational drug use  Lives in [facility] as long term care   Mostly wheelchair bound

## 2019-06-28 NOTE — ED PROVIDER NOTE - OBJECTIVE STATEMENT
74 yo f hx bradycardia s/p PM, CAD, RA, htn, per chart dvt on coumadin here for PM malfunction. pt sees Delano for EP. pt had routine visit with him and her PM battery . EMS was called and pt was brought to ED. Discussed with supervisor at [place] ELAINE Goldman- per him, NO abnormal vitals when he spoke to ems. pt denies dizziness, palpitations, cp, sob, syncope. pt feels baseline. 72 yo f hx bradycardia s/p PM, CAD, RA, htn, per chart dvt on coumadin here for PM malfunction. pt sees Delano for EP. pt had routine visit with him and her PM battery . EMS was called and pt was brought to ED. Discussed with supervisor at [place] ELAINE Goldman- per him, NO BP when he spoke to ems. pt denies dizziness, palpitations, cp, sob, syncope. pt feels baseline.

## 2019-06-28 NOTE — H&P ADULT - NSHPREVIEWOFSYSTEMS_GEN_ALL_CORE
This is a 73 year old female with past medical history of CAD, Rheumatoid Arthritis, Hypertension, systolic heart failure (EF unknown) and DVT on Coumadin who was sent in for Pacemaker at end of life. Patient has a pacemaker for a few years due to a cardiac arrythmia that she could not further describe. Patient is followed by Dr. Wick. She was scheduled to see him and found to have a pacemaker battery that has . She reported no acute complaints and is completely asymptomatic. She is to be followed by EPS in the morning for generator change. The patient at baseline is a long term resident at [facility]; where she has lived for the past five years. She gets around via wheelchair.     In the ED vitals: T36.7, HR 57, /54, RR 18, O2 Sat 98% on Room Air General: No fevers, chills, weight changes	  Skin/Breast: No skin rashes or wounds  Ophthalmologic: No blurry vision, double vision, recent changes in vision  ENMT: No difficulty hearing, ringing in ears, nasal discharge, throat pain, difficulty swallowing  Respiratory and Thorax: No coughing, wheezing, shortness of breath  Cardiovascular: No chest pain, palpitations  Gastrointestinal: No abdominal pain, constipation, diarrhea, nausea, vomiting  Genitourinary: No dysuria, polyuria, pyuria, hematuria  Musculoskeletal: No muscle aches or joint aches  Neurological: No numbness or tingling  Psychiatric: Regular mood  Hematology/Lymphatics: No easy bruising	  Endocrine: No hot or cold intolerance  Allergic/Immunologic: None

## 2019-06-28 NOTE — ED PROVIDER NOTE - PHYSICAL EXAMINATION
PHYSICAL EXAM:    Constitutional: awake, alert, NAD  Eyes: EOMI, no conj injection  HENT: NC AT  Respiratory: no respiratory distress, breath sounds equal b/l, no wheezing, rhonchi or stridor.   Cardiovascular: RRR nml S1S2  Gastrointestinal: soft, no masses, nontender, nondistended. No guarding or rebound.   Extremities: no peripheral edema  Neurological: AAOx3, CN II-XII grossly intact, no focal numbness or weakness  Skin: no rash  Musculoskeletal: no gross deformity

## 2019-06-28 NOTE — H&P ADULT - NSICDXPASTMEDICALHX_GEN_ALL_CORE_FT
PAST MEDICAL HISTORY:  Bradycardia s/p pacemaker but not pacemaker dependent    Coronary artery disease involving native coronary artery of native heart without angina pectoris     Hypertension, unspecified type     Rheumatoid arthritis, involving unspecified site, unspecified rheumatoid factor presence

## 2019-06-28 NOTE — ED PROVIDER NOTE - CLINICAL SUMMARY MEDICAL DECISION MAKING FREE TEXT BOX
pt here for drained PM battery by EP. pt asymptomatic, feeling well during the day and in the ER. d/w Delano and [facility] supervisor- no hypotension noted. will admit tele. no high grade blocks

## 2019-06-28 NOTE — ED PROVIDER NOTE - PROGRESS NOTE DETAILS
d/w Delano- pt's BM battery drained, will need replacement. Dr. Chun to see patient as inpt pt feeling well, HD stable, no cp, sob, lightheadedness. will admit med tele

## 2019-06-28 NOTE — H&P ADULT - ATTENDING COMMENTS
I agree with the above history ,physical and plan which I Have reviewed and examined independently    going foe generator change PPM  EPS consult noted

## 2019-06-28 NOTE — PHYSICAL EXAM
[General Appearance - Well Developed] : well developed [Normal Appearance] : normal appearance [Well Groomed] : well groomed [General Appearance - Well Nourished] : well nourished [No Deformities] : no deformities [Heart Rate And Rhythm] : heart rate and rhythm were normal [General Appearance - In No Acute Distress] : no acute distress [Heart Sounds] : normal S1 and S2 [Exaggerated Use Of Accessory Muscles For Inspiration] : no accessory muscle use [Murmurs] : no murmurs present [Respiration, Rhythm And Depth] : normal respiratory rhythm and effort [Clean] : clean [Auscultation Breath Sounds / Voice Sounds] : lungs were clear to auscultation bilaterally [Dry] : dry [Well-Healed] : well-healed [Abdomen Soft] : soft [Abdomen Tenderness] : non-tender [Abdomen Mass (___ Cm)] : no abdominal mass palpated [Nail Clubbing] : no clubbing of the fingernails [Petechial Hemorrhages (___cm)] : no petechial hemorrhages [Cyanosis, Localized] : no localized cyanosis [] : no ischemic changes

## 2019-06-28 NOTE — HISTORY OF PRESENT ILLNESS
[None] : The patient complains of no symptoms [Chest Pain] : chest pain [Palpitations] : no palpitations [SOB] : no dyspnea [Erythema at Site] : no erythema at device site [Swelling at Site] : no swelling at device site [de-identified] : Py c/o CP for 6 month; caro nd off with shacking. Pt does not know why is shaking.

## 2019-06-28 NOTE — ED PROVIDER NOTE - PMH
Bradycardia  s/p pacemaker but not pacemaker dependent  Coronary artery disease involving native coronary artery of native heart without angina pectoris    Hypertension, unspecified type    Rheumatoid arthritis, involving unspecified site, unspecified rheumatoid factor presence

## 2019-06-29 DIAGNOSIS — Z45.010 ENCOUNTER FOR CHECKING AND TESTING OF CARDIAC PACEMAKER PULSE GENERATOR [BATTERY]: ICD-10-CM

## 2019-06-29 LAB
ANION GAP SERPL CALC-SCNC: 9 MMOL/L — SIGNIFICANT CHANGE UP (ref 7–14)
BASOPHILS # BLD AUTO: 0.06 K/UL — SIGNIFICANT CHANGE UP (ref 0–0.2)
BASOPHILS NFR BLD AUTO: 1.1 % — HIGH (ref 0–1)
BUN SERPL-MCNC: 22 MG/DL — HIGH (ref 10–20)
CALCIUM SERPL-MCNC: 8.9 MG/DL — SIGNIFICANT CHANGE UP (ref 8.5–10.1)
CHLORIDE SERPL-SCNC: 108 MMOL/L — SIGNIFICANT CHANGE UP (ref 98–110)
CO2 SERPL-SCNC: 28 MMOL/L — SIGNIFICANT CHANGE UP (ref 17–32)
CREAT SERPL-MCNC: 0.8 MG/DL — SIGNIFICANT CHANGE UP (ref 0.7–1.5)
EOSINOPHIL # BLD AUTO: 0.19 K/UL — SIGNIFICANT CHANGE UP (ref 0–0.7)
EOSINOPHIL NFR BLD AUTO: 3.6 % — SIGNIFICANT CHANGE UP (ref 0–8)
GLUCOSE SERPL-MCNC: 79 MG/DL — SIGNIFICANT CHANGE UP (ref 70–99)
HCT VFR BLD CALC: 33.6 % — LOW (ref 37–47)
HGB BLD-MCNC: 10.3 G/DL — LOW (ref 12–16)
IMM GRANULOCYTES NFR BLD AUTO: 0.2 % — SIGNIFICANT CHANGE UP (ref 0.1–0.3)
INR BLD: 1.58 RATIO — HIGH (ref 0.65–1.3)
LYMPHOCYTES # BLD AUTO: 2.03 K/UL — SIGNIFICANT CHANGE UP (ref 1.2–3.4)
LYMPHOCYTES # BLD AUTO: 38 % — SIGNIFICANT CHANGE UP (ref 20.5–51.1)
MAGNESIUM SERPL-MCNC: 2.1 MG/DL — SIGNIFICANT CHANGE UP (ref 1.8–2.4)
MCHC RBC-ENTMCNC: 23.7 PG — LOW (ref 27–31)
MCHC RBC-ENTMCNC: 30.7 G/DL — LOW (ref 32–37)
MCV RBC AUTO: 77.2 FL — LOW (ref 81–99)
MONOCYTES # BLD AUTO: 0.46 K/UL — SIGNIFICANT CHANGE UP (ref 0.1–0.6)
MONOCYTES NFR BLD AUTO: 8.6 % — SIGNIFICANT CHANGE UP (ref 1.7–9.3)
NEUTROPHILS # BLD AUTO: 2.59 K/UL — SIGNIFICANT CHANGE UP (ref 1.4–6.5)
NEUTROPHILS NFR BLD AUTO: 48.5 % — SIGNIFICANT CHANGE UP (ref 42.2–75.2)
NRBC # BLD: 0 /100 WBCS — SIGNIFICANT CHANGE UP (ref 0–0)
PLATELET # BLD AUTO: 261 K/UL — SIGNIFICANT CHANGE UP (ref 130–400)
POTASSIUM SERPL-MCNC: 4 MMOL/L — SIGNIFICANT CHANGE UP (ref 3.5–5)
POTASSIUM SERPL-SCNC: 4 MMOL/L — SIGNIFICANT CHANGE UP (ref 3.5–5)
PROTHROM AB SERPL-ACNC: 18.1 SEC — HIGH (ref 9.95–12.87)
RBC # BLD: 4.35 M/UL — SIGNIFICANT CHANGE UP (ref 4.2–5.4)
RBC # FLD: 15.6 % — HIGH (ref 11.5–14.5)
SODIUM SERPL-SCNC: 145 MMOL/L — SIGNIFICANT CHANGE UP (ref 135–146)
WBC # BLD: 5.34 K/UL — SIGNIFICANT CHANGE UP (ref 4.8–10.8)
WBC # FLD AUTO: 5.34 K/UL — SIGNIFICANT CHANGE UP (ref 4.8–10.8)

## 2019-06-29 PROCEDURE — 99223 1ST HOSP IP/OBS HIGH 75: CPT

## 2019-06-29 RX ORDER — WARFARIN SODIUM 2.5 MG/1
10 TABLET ORAL ONCE
Refills: 0 | Status: COMPLETED | OUTPATIENT
Start: 2019-06-29 | End: 2019-06-29

## 2019-06-29 RX ADMIN — Medication 10 MILLIEQUIVALENT(S): at 12:49

## 2019-06-29 RX ADMIN — CHLORHEXIDINE GLUCONATE 1 APPLICATION(S): 213 SOLUTION TOPICAL at 05:08

## 2019-06-29 RX ADMIN — WARFARIN SODIUM 10 MILLIGRAM(S): 2.5 TABLET ORAL at 21:30

## 2019-06-29 RX ADMIN — Medication 100 MILLIGRAM(S): at 17:35

## 2019-06-29 RX ADMIN — Medication 10 MILLIGRAM(S): at 09:53

## 2019-06-29 RX ADMIN — Medication 100 MILLIGRAM(S): at 05:27

## 2019-06-29 RX ADMIN — Medication 10 MILLIGRAM(S): at 20:45

## 2019-06-29 RX ADMIN — SIMVASTATIN 10 MILLIGRAM(S): 20 TABLET, FILM COATED ORAL at 21:29

## 2019-06-29 RX ADMIN — FAMOTIDINE 20 MILLIGRAM(S): 10 INJECTION INTRAVENOUS at 21:29

## 2019-06-29 RX ADMIN — Medication 1 TABLET(S): at 12:49

## 2019-06-29 RX ADMIN — LACTULOSE 20 GRAM(S): 10 SOLUTION ORAL at 12:49

## 2019-06-29 RX ADMIN — MIRTAZAPINE 15 MILLIGRAM(S): 45 TABLET, ORALLY DISINTEGRATING ORAL at 21:29

## 2019-06-29 RX ADMIN — SENNA PLUS 2 TABLET(S): 8.6 TABLET ORAL at 21:29

## 2019-06-29 NOTE — PROGRESS NOTE ADULT - SUBJECTIVE AND OBJECTIVE BOX
Patient is a 73y old  Female who presents with a chief complaint of Pacemaker Battery at End of Life (28 Jun 2019 22:22)    HPI:  72 yo F with history of CAD, HTN, sinus bradycardia s/p DC PPM (implanted 2/9/10), DVT on coumadin sent in from Dr. Wick's office for generator replacement for pacemaker battery at end of life. Patient cannot be interrogated due to EOL battery. Patient denies any cardiac complaints. No palpitations, dizziness, lightheadedness. No syncope. She is a long term resident at [facility], where she has lived for the past five years. She gets around via wheelchair.     PAST MEDICAL & SURGICAL HISTORY:  Hypertension, unspecified type  Coronary artery disease involving native coronary artery of native heart without angina pectoris  Bradycardia: s/p pacemaker but not pacemaker dependent  Rheumatoid arthritis, involving unspecified site, unspecified rheumatoid factor presence  Artificial pacemaker      PREVIOUS DIAGNOSTIC TESTING:      ECHO FINDINGS 4/14/17:  Tech difficult with suboptimal views.   EF 55-65%  Trace to mild MR  Mod TR  Impaired relaxation      MEDICATIONS  (STANDING):  busPIRone 10 milliGRAM(s) Oral two times a day  chlorhexidine 4% Liquid 1 Application(s) Topical <User Schedule>  docusate sodium 100 milliGRAM(s) Oral two times a day  famotidine    Tablet 20 milliGRAM(s) Oral at bedtime  furosemide    Tablet 40 milliGRAM(s) Oral daily  lactulose Syrup 20 Gram(s) Oral daily  lisinopril 2.5 milliGRAM(s) Oral daily  mirtazapine 15 milliGRAM(s) Oral at bedtime  multivitamin 1 Tablet(s) Oral daily  potassium chloride    Tablet ER 10 milliEquivalent(s) Oral daily  senna 2 Tablet(s) Oral at bedtime  simvastatin 10 milliGRAM(s) Oral at bedtime    MEDICATIONS  (PRN):      FAMILY HISTORY:  No pertinent family history in first degree relatives      SOCIAL HISTORY  CIGARETTES:  ALCOHOL:    Past Surgical History:    Allergies:  No Known Allergies      REVIEW OF SYSTEMS:  CONSTITUTIONAL: No fever, weight loss, chills  EYES: No visual problems  ENMT:  No difficulty hearing, tinnitus, vertigo  NECK: No neck pain   RESPIRATORY: No dyspnea, cough, wheezing  CARDIOVASCULAR: No chest pain, palpitations, dizziness, syncope, paroxysmal nocturnal dyspnea, orthopnea, leg swelling  GASTROINTESTINAL: No abdominal pain, nausea, vomiting, hematemesis, diarrhea, constipation, melena or bright red blood  GENITOURINARY: No dysuria, nocturia, hematuria, or urinary incontinence  NEUROLOGICAL: No headaches, memory loss, slurred speech, limb weakness, loss of strength, numbness, or tremors  SKIN: No rashes   ENDOCRINE: No heat or cold intolerance, or hair loss  MUSCULOSKELETAL: No joint pain or swelling, muscle, back, or extremity pain  PSYCHIATRIC: No depression or anxiety  HEME/LYMPH: No easy bruising or bleeding gums      Vital Signs Last 24 Hrs  T(C): 36.7 (29 Jun 2019 05:28), Max: 36.8 (28 Jun 2019 15:57)  T(F): 98 (29 Jun 2019 05:28), Max: 98.2 (28 Jun 2019 15:57)  HR: 50 (29 Jun 2019 07:54) (50 - 61)  BP: 98/55 (29 Jun 2019 07:54) (87/47 - 115/57)  BP(mean): --  RR: 18 (29 Jun 2019 07:54) (17 - 18)  SpO2: 100% (29 Jun 2019 07:54) (97% - 100%)    TELEMETRY: NSR    PHYSICAL EXAM  GENERAL: No apparent distress, well developed, well nourished  HEAD:  Normocephalic, atraumatic  EYES: EOMI, PERRLA  ENMT: Moist mucous membranes   NECK: Supple.   HEART: Regular rate and rhythm; No murmurs, rubs, or gallops.  PULMONARY: Clear to auscultation bilaterally.  No rales, wheezing, or rhonchi bilaterally.  ABDOMEN: Normoactive bowel sounds   EXTREMITIES:  2+ Peripheral Pulses, No clubbing, cyanosis, or edema  NEUROLOGICAL: Grossly nonfocal      ECG:  < from: 12 Lead ECG (06.28.19 @ 16:04) >  Sinus bradycardia with 1st degree A-V block ( msec)  Otherwise normal ECG  < end of copied text >      LABS:                        10.3   5.34  )-----------( 261      ( 29 Jun 2019 06:44 )             33.6     06-29    145  |  108  |  22<H>  ----------------------------<  79  4.0   |  28  |  0.8    Ca    8.9      29 Jun 2019 06:44  Phos  3.8     06-28  Mg     2.1     06-29    TPro  6.9  /  Alb  4.1  /  TBili  0.5  /  DBili  x   /  AST  19  /  ALT  13  /  AlkPhos  65  06-28    PT/INR - ( 28 Jun 2019 18:38 )   PT: 18.30 sec;   INR: 1.60 ratio       PTT - ( 28 Jun 2019 18:38 )  PTT:29.5 sec    BNP  I&O's Detail    Daily     Daily     RADIOLOGY & ADDITIONAL STUDIES:

## 2019-06-29 NOTE — PROGRESS NOTE ADULT - SUBJECTIVE AND OBJECTIVE BOX
LISA JENKINS 73y Female  MRN#: [id number]       SUBJECTIVE  Patient is a 73y old Female who presents with a chief complaint of Pacemaker Battery at End of Life  Currently admitted to medicine with the primary diagnosis of Pacemaker at end of battery life  Today is hospital day 1d, and this morning she  reports no overnight events.     OBJECTIVE  PAST MEDICAL & SURGICAL HISTORY  Hypertension, unspecified type  Coronary artery disease involving native coronary artery of native heart without angina pectoris  Bradycardia: s/p pacemaker but not pacemaker dependent  Rheumatoid arthritis, involving unspecified site, unspecified rheumatoid factor presence  Artificial pacemaker    ALLERGIES:  No Known Allergies    MEDICATIONS:  STANDING MEDICATIONS  busPIRone 10 milliGRAM(s) Oral two times a day  chlorhexidine 4% Liquid 1 Application(s) Topical <User Schedule>  docusate sodium 100 milliGRAM(s) Oral two times a day  famotidine    Tablet 20 milliGRAM(s) Oral at bedtime  furosemide    Tablet 40 milliGRAM(s) Oral daily  lactulose Syrup 20 Gram(s) Oral daily  lisinopril 2.5 milliGRAM(s) Oral daily  mirtazapine 15 milliGRAM(s) Oral at bedtime  multivitamin 1 Tablet(s) Oral daily  potassium chloride    Tablet ER 10 milliEquivalent(s) Oral daily  senna 2 Tablet(s) Oral at bedtime  simvastatin 10 milliGRAM(s) Oral at bedtime  warfarin 10 milliGRAM(s) Oral once    PRN MEDICATIONS      VITAL SIGNS: Last 24 Hours  T(C): 36.7 (29 Jun 2019 05:28), Max: 36.8 (28 Jun 2019 15:57)  T(F): 98 (29 Jun 2019 05:28), Max: 98.2 (28 Jun 2019 15:57)  HR: 50 (29 Jun 2019 07:54) (50 - 61)  BP: 98/55 (29 Jun 2019 07:54) (87/47 - 115/57)  BP(mean): --  RR: 18 (29 Jun 2019 07:54) (17 - 18)  SpO2: 100% (29 Jun 2019 07:54) (97% - 100%)    LABS:                        10.3   5.34  )-----------( 261      ( 29 Jun 2019 06:44 )             33.6     06-29    145  |  108  |  22<H>  ----------------------------<  79  4.0   |  28  |  0.8    Ca    8.9      29 Jun 2019 06:44  Phos  3.8     06-28  Mg     2.1     06-29    TPro  6.9  /  Alb  4.1  /  TBili  0.5  /  DBili  x   /  AST  19  /  ALT  13  /  AlkPhos  65  06-28    PT/INR - ( 28 Jun 2019 18:38 )   PT: 18.30 sec;   INR: 1.60 ratio         PTT - ( 28 Jun 2019 18:38 )  PTT:29.5 sec        RADIOLOGY:  < from: Xray Chest 1 View-PORTABLE IMMEDIATE (06.28.19 @ 20:07) >  IMPRESSION:     Left pacemaker with a right atrial lead and a coiled right ventricular   lead present, stable.    < end of copied text >      PHYSICAL EXAM:    GENERAL: NAD, well-developed, AAOx3  HEENT:  Atraumatic, Normocephalic. EOMI, PERRLA, conjunctiva and sclera clear, No JVD  PULMONARY: Clear to auscultation bilaterally; No wheeze  CARDIOVASCULAR: Regular rate and rhythm; No murmurs, rubs, or gallops  GASTROINTESTINAL: Soft, Nontender, Nondistended; Bowel sounds present  MUSCULOSKELETAL:  2+ Peripheral Pulses, No clubbing, cyanosis, or edema  NEUROLOGY: non-focal  SKIN: No rashes or lesions      ADMISSION SUMMARY  Patient is a 73y old Female who presents with a chief complaint of Pacemaker Battery at End of Life   Currently admitted to medicine with the primary diagnosis of Pacemaker at end of battery life      ASSESSMENT & PLAN  73 year old female with past medical history of CAD, Rheumatoid Arthritis, Hypertension, systolic heart failure (EF unknown) and h/o DVT who was sent in for Pacemaker at end of life.    #Pacemaker Battery at End of life  - EPS consulted for generator change  - Scheduled for battery change on monday. NPO after midnight on sunday  - Can give coumadin as per EP. Ordered coumadin for tonight     -#Hypertension:  - Continue on Lisinopril 2.5mg Daily, Lasix 40mg PO Daily    #H/o Systolic Heart Failure  - No TTE in system but documented history; TTE ordered  - Continue on Lasix 40mg PO daily  - Continue on Lisinopril 2.5mg daily    - Will hold off on any AV louann blocking agents for now given patient's generator at end of life    #H/o CAD  - Holding ASA 81mg for now pending EPS plan    #Rheumatoid Arthritis  - Stable; outpatient follow up     #Anxiety and insomnia   - On Buspar 10mg q12 and remron 15mg (22.5 documented at [facility])    #Chronic DVT  - Reportedly on Coumadin 10mg at nursing facility  - f/u 4pm INR. Coumadin ordered for tonight. f/u INR tomorrow am and dose coumadin accordingly    #Constipation  - Continue on colace, senna, and lactulose that patient is taking at facility    #Hypokalemia  - Continue on Potassium Chloride 10meq daily     #Suspected Vitamin D deficiency  - On Vitamin D3 50,000 units daily.     Activity: As tolerated  Diet: DASH  DVT ppx: coumadin  GI ppx: Not indicated  Code Status: Full Code  DISPO: acute    Med rec confirmed with [facility]

## 2019-06-29 NOTE — PROGRESS NOTE ADULT - SUBJECTIVE AND OBJECTIVE BOX
SUBJECTIVE:    Patient is a 73y old Female who presents with a chief complaint of Pacemaker Battery at End of Life (29 Jun 2019 09:44)    Currently admitted to medicine with the primary diagnosis of Pacemaker at end of battery life     Today is hospital day 1d. This morning she is resting comfortably in bed and reports no new issues or overnight events.     PAST MEDICAL & SURGICAL HISTORY  Hypertension, unspecified type  Coronary artery disease involving native coronary artery of native heart without angina pectoris  Bradycardia: s/p pacemaker but not pacemaker dependent  Rheumatoid arthritis, involving unspecified site, unspecified rheumatoid factor presence  Artificial pacemaker    SOCIAL HISTORY:  Negative for smoking/alcohol/drug use.     ALLERGIES:  No Known Allergies    MEDICATIONS:  STANDING MEDICATIONS  busPIRone 10 milliGRAM(s) Oral two times a day  chlorhexidine 4% Liquid 1 Application(s) Topical <User Schedule>  docusate sodium 100 milliGRAM(s) Oral two times a day  famotidine    Tablet 20 milliGRAM(s) Oral at bedtime  furosemide    Tablet 40 milliGRAM(s) Oral daily  lactulose Syrup 20 Gram(s) Oral daily  lisinopril 2.5 milliGRAM(s) Oral daily  mirtazapine 15 milliGRAM(s) Oral at bedtime  multivitamin 1 Tablet(s) Oral daily  potassium chloride    Tablet ER 10 milliEquivalent(s) Oral daily  senna 2 Tablet(s) Oral at bedtime  simvastatin 10 milliGRAM(s) Oral at bedtime  warfarin 10 milliGRAM(s) Oral once    PRN MEDICATIONS    VITALS:   T(F): 98  HR: 50  BP: 98/55  RR: 18  SpO2: 100%    LABS:                        10.3   5.34  )-----------( 261      ( 29 Jun 2019 06:44 )             33.6     06-29    145  |  108  |  22<H>  ----------------------------<  79  4.0   |  28  |  0.8    Ca    8.9      29 Jun 2019 06:44  Phos  3.8     06-28  Mg     2.1     06-29    TPro  6.9  /  Alb  4.1  /  TBili  0.5  /  DBili  x   /  AST  19  /  ALT  13  /  AlkPhos  65  06-28    PT/INR - ( 28 Jun 2019 18:38 )   PT: 18.30 sec;   INR: 1.60 ratio         PTT - ( 28 Jun 2019 18:38 )  PTT:29.5 sec              RADIOLOGY:    PHYSICAL EXAM:  GEN: No acute distress  LUNGS: Clear to auscultation bilaterally   HEART: christian   ABD: Soft, non-tender, non-distended.  EXT: NC/NC/NE/2+PP/LIZARRAGA/Skin Intact.   NEURO: AAOX3    Intravenous access:   NG tube:   Lozano Catheter:

## 2019-06-30 LAB
ANION GAP SERPL CALC-SCNC: 11 MMOL/L — SIGNIFICANT CHANGE UP (ref 7–14)
APPEARANCE UR: CLEAR — SIGNIFICANT CHANGE UP
APTT BLD: 32.7 SEC — SIGNIFICANT CHANGE UP (ref 27–39.2)
BILIRUB UR-MCNC: NEGATIVE — SIGNIFICANT CHANGE UP
BUN SERPL-MCNC: 21 MG/DL — HIGH (ref 10–20)
CALCIUM SERPL-MCNC: 9 MG/DL — SIGNIFICANT CHANGE UP (ref 8.5–10.1)
CHLORIDE SERPL-SCNC: 108 MMOL/L — SIGNIFICANT CHANGE UP (ref 98–110)
CO2 SERPL-SCNC: 25 MMOL/L — SIGNIFICANT CHANGE UP (ref 17–32)
COLOR SPEC: YELLOW — SIGNIFICANT CHANGE UP
CREAT SERPL-MCNC: 0.9 MG/DL — SIGNIFICANT CHANGE UP (ref 0.7–1.5)
DIFF PNL FLD: NEGATIVE — SIGNIFICANT CHANGE UP
GLUCOSE SERPL-MCNC: 96 MG/DL — SIGNIFICANT CHANGE UP (ref 70–99)
GLUCOSE UR QL: NEGATIVE MG/DL — SIGNIFICANT CHANGE UP
HCT VFR BLD CALC: 34.2 % — LOW (ref 37–47)
HGB BLD-MCNC: 10.5 G/DL — LOW (ref 12–16)
INR BLD: 1.33 RATIO — HIGH (ref 0.65–1.3)
KETONES UR-MCNC: NEGATIVE — SIGNIFICANT CHANGE UP
LEUKOCYTE ESTERASE UR-ACNC: NEGATIVE — SIGNIFICANT CHANGE UP
MAGNESIUM SERPL-MCNC: 2.2 MG/DL — SIGNIFICANT CHANGE UP (ref 1.8–2.4)
MCHC RBC-ENTMCNC: 23.6 PG — LOW (ref 27–31)
MCHC RBC-ENTMCNC: 30.7 G/DL — LOW (ref 32–37)
MCV RBC AUTO: 76.9 FL — LOW (ref 81–99)
NITRITE UR-MCNC: NEGATIVE — SIGNIFICANT CHANGE UP
NRBC # BLD: 0 /100 WBCS — SIGNIFICANT CHANGE UP (ref 0–0)
PH UR: 6 — SIGNIFICANT CHANGE UP (ref 5–8)
PLATELET # BLD AUTO: 260 K/UL — SIGNIFICANT CHANGE UP (ref 130–400)
POTASSIUM SERPL-MCNC: 4.4 MMOL/L — SIGNIFICANT CHANGE UP (ref 3.5–5)
POTASSIUM SERPL-SCNC: 4.4 MMOL/L — SIGNIFICANT CHANGE UP (ref 3.5–5)
PROT UR-MCNC: NEGATIVE MG/DL — SIGNIFICANT CHANGE UP
PROTHROM AB SERPL-ACNC: 15.2 SEC — HIGH (ref 9.95–12.87)
RBC # BLD: 4.45 M/UL — SIGNIFICANT CHANGE UP (ref 4.2–5.4)
RBC # FLD: 15.7 % — HIGH (ref 11.5–14.5)
SODIUM SERPL-SCNC: 144 MMOL/L — SIGNIFICANT CHANGE UP (ref 135–146)
SP GR SPEC: 1.01 — SIGNIFICANT CHANGE UP (ref 1.01–1.03)
UROBILINOGEN FLD QL: 0.2 MG/DL — SIGNIFICANT CHANGE UP (ref 0.2–0.2)
WBC # BLD: 5.95 K/UL — SIGNIFICANT CHANGE UP (ref 4.8–10.8)
WBC # FLD AUTO: 5.95 K/UL — SIGNIFICANT CHANGE UP (ref 4.8–10.8)

## 2019-06-30 PROCEDURE — 93306 TTE W/DOPPLER COMPLETE: CPT | Mod: 26

## 2019-06-30 RX ORDER — WARFARIN SODIUM 2.5 MG/1
10 TABLET ORAL ONCE
Refills: 0 | Status: COMPLETED | OUTPATIENT
Start: 2019-06-30 | End: 2019-06-30

## 2019-06-30 RX ADMIN — Medication 1 TABLET(S): at 12:34

## 2019-06-30 RX ADMIN — Medication 10 MILLIGRAM(S): at 05:39

## 2019-06-30 RX ADMIN — SIMVASTATIN 10 MILLIGRAM(S): 20 TABLET, FILM COATED ORAL at 21:24

## 2019-06-30 RX ADMIN — Medication 10 MILLIGRAM(S): at 17:32

## 2019-06-30 RX ADMIN — Medication 100 MILLIGRAM(S): at 05:39

## 2019-06-30 RX ADMIN — WARFARIN SODIUM 10 MILLIGRAM(S): 2.5 TABLET ORAL at 21:24

## 2019-06-30 RX ADMIN — Medication 100 MILLIGRAM(S): at 17:32

## 2019-06-30 RX ADMIN — CHLORHEXIDINE GLUCONATE 1 APPLICATION(S): 213 SOLUTION TOPICAL at 05:41

## 2019-06-30 RX ADMIN — Medication 10 MILLIEQUIVALENT(S): at 12:35

## 2019-06-30 RX ADMIN — Medication 40 MILLIGRAM(S): at 05:39

## 2019-06-30 RX ADMIN — MIRTAZAPINE 15 MILLIGRAM(S): 45 TABLET, ORALLY DISINTEGRATING ORAL at 21:24

## 2019-06-30 RX ADMIN — LACTULOSE 20 GRAM(S): 10 SOLUTION ORAL at 12:34

## 2019-06-30 NOTE — PROGRESS NOTE ADULT - SUBJECTIVE AND OBJECTIVE BOX
Patient was seen and examined. Spoke with RN. Chart reviewed.  No events overnight.  Vital Signs Last 24 Hrs  T(F): 96.8 (2019 05:00), Max: 98.1 (2019 15:41)  HR: 57 (2019 05:00) (57 - 74)  BP: 99/55 (2019 05:00) (99/55 - 132/60)  SpO2: 98% (2019 17:15) (98% - 98%)  MEDICATIONS  (STANDING):  busPIRone 10 milliGRAM(s) Oral two times a day  chlorhexidine 4% Liquid 1 Application(s) Topical <User Schedule>  docusate sodium 100 milliGRAM(s) Oral two times a day  famotidine    Tablet 20 milliGRAM(s) Oral at bedtime  furosemide    Tablet 40 milliGRAM(s) Oral daily  lactulose Syrup 20 Gram(s) Oral daily  lisinopril 2.5 milliGRAM(s) Oral daily  mirtazapine 15 milliGRAM(s) Oral at bedtime  multivitamin 1 Tablet(s) Oral daily  potassium chloride    Tablet ER 10 milliEquivalent(s) Oral daily  senna 2 Tablet(s) Oral at bedtime  simvastatin 10 milliGRAM(s) Oral at bedtime    MEDICATIONS  (PRN):    Labs:                        10.5   5.95  )-----------( 260      ( 2019 06:23 )             34.2                         10.3   5.34  )-----------( 261      ( 2019 06:44 )             33.6     2019 06:23    144    |  108    |  21     ----------------------------<  96     4.4     |  25     |  0.9    2019 06:44    145    |  108    |  22     ----------------------------<  79     4.0     |  28     |  0.8      Ca    9.0        2019 06:23  Ca    8.9        2019 06:44  Phos  3.8       2019 18:34  Mg     2.2       2019 06:23  Mg     2.1       2019 06:44    TPro  6.9    /  Alb  4.1    /  TBili  0.5    /  DBili  x      /  AST  19     /  ALT  13     /  AlkPhos  65     2019 18:34    PT/INR - ( 2019 06:23 )   PT: 15.20 sec;   INR: 1.33 ratio         PTT - ( 2019 06:23 )  PTT:32.7 sec  Urinalysis Basic - ( 2019 09:30 )    Color: Yellow / Appearance: Clear / S.010 / pH: x  Gluc: x / Ketone: Negative  / Bili: Negative / Urobili: 0.2 mg/dL   Blood: x / Protein: Negative mg/dL / Nitrite: Negative   Leuk Esterase: Negative / RBC: x / WBC x   Sq Epi: x / Non Sq Epi: x / Bacteria: x        General: comfortable, NAD  Neurology: A&Ox3, nonfocal  Head:  Normocephalic, atraumatic  ENT:  Mucosa moist, no ulcerations  Neck:  Supple, no JVD,   Resp: CTA B/L  CV: RRR, S1S2,   GI: Soft, NT, bowel sounds  MS: No edema, + peripheral pulses, FROM all 4 extremity      A/P:  73 year old female with past medical history of CAD, Rheumatoid Arthritis, Hypertension, systolic heart failure (EF unknown) and h/o DVT who was sent in for Pacemaker at end of life.    #Pacemaker Battery at End of life  - EPS consulted for generator change  - Scheduled for battery change on monday. NPO after midnight on     plan as per EP  DVT prophylaxis  Decubitus prevention- all measures as per RN protocol  Please call or text me with any questions or updates

## 2019-06-30 NOTE — PROGRESS NOTE ADULT - SUBJECTIVE AND OBJECTIVE BOX
LISA JENKINS 73y Female  MRN#: [id number]     SUBJECTIVE  Patient is a 73y old Female who presents with a chief complaint of Pacemaker Battery at End of Life (2019 11:02)  Currently admitted to medicine with the primary diagnosis of Pacemaker at end of battery life    OBJECTIVE  PAST MEDICAL & SURGICAL HISTORY  Hypertension, unspecified type  Coronary artery disease involving native coronary artery of native heart without angina pectoris  Bradycardia: s/p pacemaker but not pacemaker dependent  Rheumatoid arthritis, involving unspecified site, unspecified rheumatoid factor presence  Artificial pacemaker    ALLERGIES:  No Known Allergies    MEDICATIONS:  STANDING MEDICATIONS  busPIRone 10 milliGRAM(s) Oral two times a day  chlorhexidine 4% Liquid 1 Application(s) Topical <User Schedule>  docusate sodium 100 milliGRAM(s) Oral two times a day  famotidine    Tablet 20 milliGRAM(s) Oral at bedtime  furosemide    Tablet 40 milliGRAM(s) Oral daily  lactulose Syrup 20 Gram(s) Oral daily  lisinopril 2.5 milliGRAM(s) Oral daily  mirtazapine 15 milliGRAM(s) Oral at bedtime  multivitamin 1 Tablet(s) Oral daily  potassium chloride    Tablet ER 10 milliEquivalent(s) Oral daily  senna 2 Tablet(s) Oral at bedtime  simvastatin 10 milliGRAM(s) Oral at bedtime    PRN MEDICATIONS      VITAL SIGNS: Last 24 Hours  T(C): 36.7 (2019 13:41), Max: 36.7 (2019 15:41)  T(F): 98 (2019 13:41), Max: 98.1 (2019 15:41)  HR: 65 (2019 13:41) (57 - 74)  BP: 128/67 (2019 13:41) (99/55 - 132/60)  BP(mean): --  RR: 18 (2019 13:41) (18 - 18)  SpO2: 98% (2019 17:15) (98% - 98%)    LABS:                        10.5   5.95  )-----------( 260      ( 2019 06:23 )             34.2     06-30    144  |  108  |  21<H>  ----------------------------<  96  4.4   |  25  |  0.9    Ca    9.0      2019 06:23  Phos  3.8     06-28  Mg     2.2     -30    TPro  6.9  /  Alb  4.1  /  TBili  0.5  /  DBili  x   /  AST  19  /  ALT  13  /  AlkPhos  65  06-28    PT/INR - ( 2019 06:23 )   PT: 15.20 sec;   INR: 1.33 ratio         PTT - ( 2019 06:23 )  PTT:32.7 sec  Urinalysis Basic - ( 2019 09:30 )    Color: Yellow / Appearance: Clear / S.010 / pH: x  Gluc: x / Ketone: Negative  / Bili: Negative / Urobili: 0.2 mg/dL   Blood: x / Protein: Negative mg/dL / Nitrite: Negative   Leuk Esterase: Negative / RBC: x / WBC x   Sq Epi: x / Non Sq Epi: x / Bacteria: x    PHYSICAL EXAM:  General: comfortable, NAD  Neurology: A&Ox3, nonfocal  Head:  Normocephalic, atraumatic  ENT:  Mucosa moist, no ulcerations  Neck:  Supple, no JVD,   Resp: CTA B/L  CV: RRR, S1S2,   GI: Soft, NT, bowel sounds  MS: No edema, + peripheral pulses, FROM all 4 extremity    ASSESSMENT & PLAN  73 year old female with past medical history of CAD, Rheumatoid Arthritis, Hypertension, systolic heart failure (EF unknown) and h/o DVT who was sent in for Pacemaker battery change    #Pacemaker Battery change  - EPS consulted for generator change  - Scheduled for battery change on monday.   -NPO after midnight    #Chronic DVT  - Reportedly on Coumadin 10mg at nursing facility  - Coumadin 10mg tonight.  -According to EPS coumadin can be continued before the procedure tomorrow.    -#Hypertension:  - Continue on Lisinopril 2.5mg Daily, Lasix 40mg PO Daily    #H/o Systolic Heart Failure???  - TTE - EF of 69 percent with grade 1 diastolic dysfunction.  Borderline pulmonary hypertension  - on Lasix 40mg PO daily  - on Lisinopril 2.5mg daily    - Will hold off on any AV louann blocking agents for now given patient's generator at end of life    #H/o CAD  - Holding ASA 81mg for now pending EPS plan    #Rheumatoid Arthritis  - Stable; outpatient follow up     #Anxiety and insomnia   - On Buspar 10mg q12 and remron 15mg (22.5 documented at [facility])    #Constipation  - Continue on colace, senna, and lactulose that patient is taking at facility    #Hypokalemia  - Continue on Potassium Chloride 10meq daily     #Suspected Vitamin D deficiency  - On Vitamin D3 50,000 units daily.     Activity: As tolerated  Diet: DASH, NPO after midnight  DVT ppx: coumadin  Code Status: Full Code  DISPO: acute

## 2019-07-01 ENCOUNTER — TRANSCRIPTION ENCOUNTER (OUTPATIENT)
Age: 74
End: 2019-07-01

## 2019-07-01 VITALS
HEART RATE: 77 BPM | TEMPERATURE: 97 F | DIASTOLIC BLOOD PRESSURE: 65 MMHG | SYSTOLIC BLOOD PRESSURE: 101 MMHG | RESPIRATION RATE: 18 BRPM

## 2019-07-01 DIAGNOSIS — Z02.9 ENCOUNTER FOR ADMINISTRATIVE EXAMINATIONS, UNSPECIFIED: ICD-10-CM

## 2019-07-01 LAB
ALBUMIN SERPL ELPH-MCNC: 4 G/DL — SIGNIFICANT CHANGE UP (ref 3.5–5.2)
ALP SERPL-CCNC: 70 U/L — SIGNIFICANT CHANGE UP (ref 30–115)
ALT FLD-CCNC: 13 U/L — SIGNIFICANT CHANGE UP (ref 0–41)
ANION GAP SERPL CALC-SCNC: 14 MMOL/L — SIGNIFICANT CHANGE UP (ref 7–14)
APTT BLD: 35.9 SEC — SIGNIFICANT CHANGE UP (ref 27–39.2)
AST SERPL-CCNC: 21 U/L — SIGNIFICANT CHANGE UP (ref 0–41)
BILIRUB SERPL-MCNC: 0.3 MG/DL — SIGNIFICANT CHANGE UP (ref 0.2–1.2)
BUN SERPL-MCNC: 19 MG/DL — SIGNIFICANT CHANGE UP (ref 10–20)
CALCIUM SERPL-MCNC: 9.1 MG/DL — SIGNIFICANT CHANGE UP (ref 8.5–10.1)
CHLORIDE SERPL-SCNC: 106 MMOL/L — SIGNIFICANT CHANGE UP (ref 98–110)
CO2 SERPL-SCNC: 24 MMOL/L — SIGNIFICANT CHANGE UP (ref 17–32)
CREAT SERPL-MCNC: 0.9 MG/DL — SIGNIFICANT CHANGE UP (ref 0.7–1.5)
GLUCOSE SERPL-MCNC: 91 MG/DL — SIGNIFICANT CHANGE UP (ref 70–99)
HCT VFR BLD CALC: 40.6 % — SIGNIFICANT CHANGE UP (ref 37–47)
HGB BLD-MCNC: 12.2 G/DL — SIGNIFICANT CHANGE UP (ref 12–16)
INR BLD: 1.34 RATIO — HIGH (ref 0.65–1.3)
MAGNESIUM SERPL-MCNC: 2.1 MG/DL — SIGNIFICANT CHANGE UP (ref 1.8–2.4)
MCHC RBC-ENTMCNC: 23.8 PG — LOW (ref 27–31)
MCHC RBC-ENTMCNC: 30 G/DL — LOW (ref 32–37)
MCV RBC AUTO: 79.1 FL — LOW (ref 81–99)
NRBC # BLD: 0 /100 WBCS — SIGNIFICANT CHANGE UP (ref 0–0)
PLATELET # BLD AUTO: 181 K/UL — SIGNIFICANT CHANGE UP (ref 130–400)
POTASSIUM SERPL-MCNC: 4.3 MMOL/L — SIGNIFICANT CHANGE UP (ref 3.5–5)
POTASSIUM SERPL-SCNC: 4.3 MMOL/L — SIGNIFICANT CHANGE UP (ref 3.5–5)
PROT SERPL-MCNC: 7.1 G/DL — SIGNIFICANT CHANGE UP (ref 6–8)
PROTHROM AB SERPL-ACNC: 15.4 SEC — HIGH (ref 9.95–12.87)
RBC # BLD: 5.13 M/UL — SIGNIFICANT CHANGE UP (ref 4.2–5.4)
RBC # FLD: 15.9 % — HIGH (ref 11.5–14.5)
SODIUM SERPL-SCNC: 144 MMOL/L — SIGNIFICANT CHANGE UP (ref 135–146)
WBC # BLD: 6.12 K/UL — SIGNIFICANT CHANGE UP (ref 4.8–10.8)
WBC # FLD AUTO: 6.12 K/UL — SIGNIFICANT CHANGE UP (ref 4.8–10.8)

## 2019-07-01 PROCEDURE — 33228 REMV&REPLC PM GEN DUAL LEAD: CPT | Mod: KX

## 2019-07-01 RX ORDER — CEPHALEXIN 500 MG
1 CAPSULE ORAL
Qty: 10 | Refills: 0
Start: 2019-07-01 | End: 2019-07-05

## 2019-07-01 RX ORDER — VANCOMYCIN HCL 1 G
1000 VIAL (EA) INTRAVENOUS ONCE
Refills: 0 | Status: COMPLETED | OUTPATIENT
Start: 2019-07-01 | End: 2019-07-01

## 2019-07-01 RX ORDER — VANCOMYCIN HCL 1 G
1500 VIAL (EA) INTRAVENOUS ONCE
Refills: 0 | Status: COMPLETED | OUTPATIENT
Start: 2019-07-01 | End: 2019-07-01

## 2019-07-01 RX ADMIN — Medication 10 MILLIGRAM(S): at 05:47

## 2019-07-01 RX ADMIN — Medication 10 MILLIEQUIVALENT(S): at 17:52

## 2019-07-01 RX ADMIN — Medication 10 MILLIGRAM(S): at 17:53

## 2019-07-01 RX ADMIN — Medication 300 MILLIGRAM(S): at 12:23

## 2019-07-01 RX ADMIN — Medication 250 MILLIGRAM(S): at 12:22

## 2019-07-01 RX ADMIN — SIMVASTATIN 10 MILLIGRAM(S): 20 TABLET, FILM COATED ORAL at 21:03

## 2019-07-01 RX ADMIN — MIRTAZAPINE 15 MILLIGRAM(S): 45 TABLET, ORALLY DISINTEGRATING ORAL at 21:03

## 2019-07-01 RX ADMIN — CHLORHEXIDINE GLUCONATE 1 APPLICATION(S): 213 SOLUTION TOPICAL at 05:47

## 2019-07-01 RX ADMIN — LISINOPRIL 2.5 MILLIGRAM(S): 2.5 TABLET ORAL at 05:47

## 2019-07-01 RX ADMIN — FAMOTIDINE 20 MILLIGRAM(S): 10 INJECTION INTRAVENOUS at 21:03

## 2019-07-01 RX ADMIN — Medication 40 MILLIGRAM(S): at 05:47

## 2019-07-01 RX ADMIN — Medication 100 MILLIGRAM(S): at 17:53

## 2019-07-01 NOTE — DISCHARGE NOTE PROVIDER - CARE PROVIDERS DIRECT ADDRESSES
,[email or web address]

## 2019-07-01 NOTE — CHART NOTE - NSCHARTNOTEFT_GEN_A_CORE
PACU ANESTHESIA ADMISSION NOTE      Procedure: Pacemaker generator change  Post op diagnosis:  Bradycardia/ Pacemaker    ____  Intubated  TV:______       Rate: ______      FiO2: ______    _x___  Patent Airway    _x___  Full return of protective reflexes    _x___  Full recovery from anesthesia / back to baseline status    Vitals:            T:    97.5            BP :  92/42              R:  18            Sat:  100%             P: 83      Mental Status:  _x___ Awake   _____ Alert   _____ Drowsy   _____ Sedated    Nausea/Vomiting:  _x___  NO       ______Yes,   See Post - Op Orders         Pain Scale (0-10):  __0___    Treatment: ____ None    _x___ See Post - Op/PCA Orders    Post - Operative Fluids:   __x__ Oral   ____ See Post - Op Orders    Plan: Discharge:   ___Home       _x____Floor     _____Critical Care    _____  Other:_________________    Comments:  No anesthesia issues or complications noted.  Discharge when criteria met.

## 2019-07-01 NOTE — PRE-ANESTHESIA EVALUATION ADULT - NSRADCARDRESULTSFT_GEN_ALL_CORE
Summary:   1. LV Ejection Fraction by Randle's Method with a biplane EF of 69 %.   2. Spectral Doppler shows impaired relaxation pattern of left   ventricular myocardial filling (Grade I diastolic dysfunction).   3. Mild mitral valve regurgitation.   4. Moderate tricuspid regurgitation.   5. Mild to moderate pulmonic valve regurgitation.   6. Estimated pulmonary artery systolic pressure is 38.0 mmHg assuming a   right atrial pressure of 5 mmHg, which is consistent with borderline   pulmonary hypertension.

## 2019-07-01 NOTE — CHART NOTE - NSCHARTNOTEFT_GEN_A_CORE
Cardiac Electrophysiology Procedure Report    Date of procedure	2019  EP Attending	Bettie Chun MD  Anesthesiologist	Monisha Ovalle, [facility]  Referring Physician	Michael Hong MD  Procedure	Generator Change of  Dual Chamber Pacemaker ([employer])    Indication: Generator at EOL  72 yo F with history of symptomatic sinus bradycardia s/p DC PPM with battery at end of life now referred for generator replacement.     EQUIPMENT AND BASELINE PARAMETERS  Pulse Generator Explanted   :	[employer]  Model Number:  [id number]  Serial Number:   [id number]  Implanted:          2010    Pulse Generator Implanted   :	[employer]  Model Number:  L311  Serial Number:   [id number]  			  Right Atrial Lead  :     [employer]  Model Number:	[id number]  Serial Number:	[id number]  Date of implant: 	2010	  Location:	Right Atrial Appendage  Sensin.9 mV  Impedance:	    403 Ohms  Threshold:	0.7 V at 0.4 msec  			  Right Ventricular Lead  :     [employer]  Model Number:	[id number]  Serial Number:	[id number]  Date of implant: 	2010	  Location:	RV apical septum  Sensin.4 mV  Impedance:	    528 Ohms  Threshold:	1.6 V at 0.4 msec    The patient was brought to the procedure room in a nonsedated and fasting state, having received preoperative antibiotics. Informed, written consent was obtained prior to the procedure. Conscious sedation was administered by the anesthesiologist. Blood pressure, oxygenation and level of comfort were stable throughout. The device was reprogrammed to DOO. The left shoulder region was cleaned and prepped with serial applications of Betadine and Chlorhexidine solution. Patient was then covered from head to toe with sterile drapes in the usual manner.     The left infraclavicular region was anesthetized with local anesthesia. An incision was made below the left clavicle along the prior incision site and the pocket was opened. The device was removed and the leads were detached. Bleeding points were cauterized. The visible portions of the leads had a normal appearance. The pocket was checked for hemostasis and then copiously irrigated with antibiotic solution. A new pulse generator was attached to the leads and appropriate function of the leads was verified through the device. The device and leads were placed into the pocket.     The wound margins were approximated well, without any tension or overlap. The wound was closed using an interrupted layer of 2-0 absorbable Vicryl suture for the deep fascial layer. This was followed by a continuous layer of 3-0 absorbable suture. The skin layer was approximated with Dermabond. Steri-strips were applied over this and a dry, sterile dressing was placed over this. Needle count was performed and found to be consistent at the end of the procedure. The patient was returned to a hospital room in stable condition.     COMPLICATIONS:  The patient tolerated the procedure well. There were no immediate complications.    CONCLUSIONS:  Successful generator change of dual chamber pacemaker    EBL:  5 cc       _______________________________  Bettie Chun MD  Cardiac Electrophysiology

## 2019-07-01 NOTE — DISCHARGE NOTE PROVIDER - CARE PROVIDER_API CALL
Chi Lynch)  Internal Medicine  [address] [place] [address]  Phone: [phone]  Fax: [phone]  Follow Up Time:     Bettie Chun)  Medicine  Physicians  [address] [place] [address]  Phone: [phone]  Fax: [phone]  Follow Up Time: Chi Lynch)  Internal Medicine  [address] [place] [address]  Phone: [phone]  Fax: [phone]  Follow Up Time:     Bettie Chun)  Medicine  Physicians  [address] [place] [address]  Phone: [phone]  Fax: [phone]  Follow Up Time:     Poncho Bartlett)  Cardiac Electrophysiology; Cardiovascular Disease; [facility] Medicine  [address] [place] [address]  Phone: [phone]  Fax: [phone]  Follow Up Time:

## 2019-07-01 NOTE — DISCHARGE NOTE PROVIDER - HOSPITAL COURSE
73 year old female with past medical history of CAD, Rheumatoid Arthritis, Hypertension, systolic heart failure (EF unknown) and h/o DVT who was sent in for Pacemaker battery change. did well and being dc back home 73 year old female with past medical history of CAD, Rheumatoid Arthritis, Hypertension, systolic heart failure (EF unknown) and h/o DVT who was sent in for Pacemaker battery change. She tolerated the genrator change well and will be discharged with Keflex 500 BID X 5days and follow up with Dr. Falk.

## 2019-07-01 NOTE — DISCHARGE NOTE PROVIDER - NSDCCPCAREPLAN_GEN_ALL_CORE_FT
PRINCIPAL DISCHARGE DIAGNOSIS  Diagnosis: Pacemaker at end of battery life  Assessment and Plan of Treatment:

## 2019-07-01 NOTE — PROGRESS NOTE ADULT - PROVIDER SPECIALTY LIST ADULT
Electrophysiology
Electrophysiology
Internal Medicine

## 2019-07-01 NOTE — PROGRESS NOTE ADULT - SUBJECTIVE AND OBJECTIVE BOX
Electrophysiology Brief Post-Op Note    I have personally seen and examined the patient.  I agree with the history and physical which I have reviewed and noted any changes below.     PRE-OP DIAGNOSIS: PPM EOL    POST-OP DIAGNOSIS: PPM EOL    PROCEDURE: DC PPM generator change    Physician: Bettie Chun MD  Assistant: None    ESTIMATED BLOOD LOSS:    5   mL    ANESTHESIA TYPE:  [  ] General Anesthesia  [ x ] Sedation  [ x ] Local/Regional    CONDITION  [  ] Critical  [  ] Serious  [  ] Fair  [ x ] Good      SPECIMENS REMOVED (IF APPLICABLE): Old PPM Generator    IMPLANTS (IF APPLICABLE): New PPM Generator ([employer])      PLAN OF CARE  - Keflex 500 mg PO BID x 5 days  - NO HEPARIN PRODUCTS OR LOVENOX   - Continue Coumadin   - No shower x 3 days.  - No swimming or hot tubs x 1 month.  - Follow up with Dr. Wick in 3-4 weeks    Bettie Chun MD   Electrophysiology Attending Electrophysiology Brief Post-Op Note    I have personally seen and examined the patient.  I agree with the history and physical which I have reviewed and noted any changes below.     PRE-OP DIAGNOSIS: PPM EOL    POST-OP DIAGNOSIS: PPM EOL    PROCEDURE: DC PPM generator change    Physician: Bettie Chun MD  Assistant: None    ESTIMATED BLOOD LOSS:    5   mL    ANESTHESIA TYPE:  [  ] General Anesthesia  [ x ] Sedation  [ x ] Local/Regional    CONDITION  [  ] Critical  [  ] Serious  [  ] Fair  [ x ] Good      SPECIMENS REMOVED (IF APPLICABLE): Old PPM Generator    IMPLANTS (IF APPLICABLE): New PPM Generator ([employer])      PLAN OF CARE  - Keflex 500 mg PO BID x 5 days  - NO HEPARIN PRODUCTS OR LOVENOX   - Continue Coumadin   - No shower x 3 days.  - No swimming or hot tubs x 1 month.  - Follow up with Dr. Wick in 3-4 weeks  - May discharge today    Bettie Chun MD   Electrophysiology Attending

## 2019-07-01 NOTE — DISCHARGE NOTE PROVIDER - PROVIDER TOKENS
PROVIDER:[TOKEN:[[id number]]],PROVIDER:[TOKEN:[[id number]]] PROVIDER:[TOKEN:[[id number]]],PROVIDER:[TOKEN:[[id number]]],PROVIDER:[TOKEN:[[id number]]]

## 2019-07-01 NOTE — DISCHARGE NOTE NURSING/CASE MANAGEMENT/SOCIAL WORK - NSDCDPATPORTLINK_GEN_ALL_CORE
You can access the [facility] Portal, offered by [facility], by registering with the following website: [email or web address]

## 2019-07-02 ENCOUNTER — OUTPATIENT (OUTPATIENT)
Dept: OUTPATIENT SERVICES | Facility: HOSPITAL | Age: 74
LOS: 1 days | Discharge: HOME | End: 2019-07-02

## 2019-07-02 DIAGNOSIS — Z95.0 PRESENCE OF CARDIAC PACEMAKER: Chronic | ICD-10-CM

## 2019-07-02 DIAGNOSIS — I82.91 CHRONIC EMBOLISM AND THROMBOSIS OF UNSPECIFIED VEIN: ICD-10-CM

## 2019-07-03 PROBLEM — I10 ESSENTIAL (PRIMARY) HYPERTENSION: Chronic | Status: ACTIVE | Noted: 2019-06-28

## 2019-07-10 DIAGNOSIS — K59.00 CONSTIPATION, UNSPECIFIED: ICD-10-CM

## 2019-07-10 DIAGNOSIS — I50.20 UNSPECIFIED SYSTOLIC (CONGESTIVE) HEART FAILURE: ICD-10-CM

## 2019-07-10 DIAGNOSIS — I11.0 HYPERTENSIVE HEART DISEASE WITH HEART FAILURE: ICD-10-CM

## 2019-07-10 DIAGNOSIS — Z45.010 ENCOUNTER FOR CHECKING AND TESTING OF CARDIAC PACEMAKER PULSE GENERATOR [BATTERY]: ICD-10-CM

## 2019-07-10 DIAGNOSIS — Z79.82 LONG TERM (CURRENT) USE OF ASPIRIN: ICD-10-CM

## 2019-07-10 DIAGNOSIS — R00.1 BRADYCARDIA, UNSPECIFIED: ICD-10-CM

## 2019-07-10 DIAGNOSIS — Z86.718 PERSONAL HISTORY OF OTHER VENOUS THROMBOSIS AND EMBOLISM: ICD-10-CM

## 2019-07-10 DIAGNOSIS — Z79.01 LONG TERM (CURRENT) USE OF ANTICOAGULANTS: ICD-10-CM

## 2019-07-10 DIAGNOSIS — Z45.018 ENCOUNTER FOR ADJUSTMENT AND MANAGEMENT OF OTHER PART OF CARDIAC PACEMAKER: ICD-10-CM

## 2019-07-10 DIAGNOSIS — E87.6 HYPOKALEMIA: ICD-10-CM

## 2019-07-10 DIAGNOSIS — M06.9 RHEUMATOID ARTHRITIS, UNSPECIFIED: ICD-10-CM

## 2019-07-10 DIAGNOSIS — I25.10 ATHEROSCLEROTIC HEART DISEASE OF NATIVE CORONARY ARTERY WITHOUT ANGINA PECTORIS: ICD-10-CM

## 2019-07-10 DIAGNOSIS — E55.9 VITAMIN D DEFICIENCY, UNSPECIFIED: ICD-10-CM

## 2019-07-10 DIAGNOSIS — G47.00 INSOMNIA, UNSPECIFIED: ICD-10-CM

## 2019-07-10 DIAGNOSIS — F41.9 ANXIETY DISORDER, UNSPECIFIED: ICD-10-CM

## 2019-07-11 ENCOUNTER — OUTPATIENT (OUTPATIENT)
Dept: OUTPATIENT SERVICES | Facility: HOSPITAL | Age: 74
LOS: 1 days | Discharge: HOME | End: 2019-07-11

## 2019-07-11 DIAGNOSIS — Z86.711 PERSONAL HISTORY OF PULMONARY EMBOLISM: ICD-10-CM

## 2019-07-11 DIAGNOSIS — E11.9 TYPE 2 DIABETES MELLITUS WITHOUT COMPLICATIONS: ICD-10-CM

## 2019-07-11 DIAGNOSIS — Z95.0 PRESENCE OF CARDIAC PACEMAKER: Chronic | ICD-10-CM

## 2019-07-12 ENCOUNTER — OUTPATIENT (OUTPATIENT)
Dept: OUTPATIENT SERVICES | Facility: HOSPITAL | Age: 74
LOS: 1 days | Discharge: HOME | End: 2019-07-12

## 2019-07-12 DIAGNOSIS — Z95.0 PRESENCE OF CARDIAC PACEMAKER: Chronic | ICD-10-CM

## 2019-07-12 DIAGNOSIS — I82.91 CHRONIC EMBOLISM AND THROMBOSIS OF UNSPECIFIED VEIN: ICD-10-CM

## 2019-07-15 ENCOUNTER — OUTPATIENT (OUTPATIENT)
Dept: OUTPATIENT SERVICES | Facility: HOSPITAL | Age: 74
LOS: 1 days | Discharge: HOME | End: 2019-07-15

## 2019-07-15 DIAGNOSIS — Z79.01 LONG TERM (CURRENT) USE OF ANTICOAGULANTS: ICD-10-CM

## 2019-07-15 DIAGNOSIS — Z95.0 PRESENCE OF CARDIAC PACEMAKER: Chronic | ICD-10-CM

## 2019-07-22 ENCOUNTER — OUTPATIENT (OUTPATIENT)
Dept: OUTPATIENT SERVICES | Facility: HOSPITAL | Age: 74
LOS: 1 days | Discharge: HOME | End: 2019-07-22

## 2019-07-22 DIAGNOSIS — Z95.0 PRESENCE OF CARDIAC PACEMAKER: Chronic | ICD-10-CM

## 2019-07-22 DIAGNOSIS — Z86.711 PERSONAL HISTORY OF PULMONARY EMBOLISM: ICD-10-CM

## 2019-07-29 ENCOUNTER — OUTPATIENT (OUTPATIENT)
Dept: OUTPATIENT SERVICES | Facility: HOSPITAL | Age: 74
LOS: 1 days | Discharge: HOME | End: 2019-07-29

## 2019-07-29 DIAGNOSIS — Z95.0 PRESENCE OF CARDIAC PACEMAKER: Chronic | ICD-10-CM

## 2019-07-29 DIAGNOSIS — Z86.711 PERSONAL HISTORY OF PULMONARY EMBOLISM: ICD-10-CM

## 2019-07-30 ENCOUNTER — OUTPATIENT (OUTPATIENT)
Dept: OUTPATIENT SERVICES | Facility: HOSPITAL | Age: 74
LOS: 1 days | Discharge: HOME | End: 2019-07-30

## 2019-07-30 DIAGNOSIS — Z86.711 PERSONAL HISTORY OF PULMONARY EMBOLISM: ICD-10-CM

## 2019-07-30 DIAGNOSIS — Z95.0 PRESENCE OF CARDIAC PACEMAKER: Chronic | ICD-10-CM

## 2019-08-05 ENCOUNTER — OUTPATIENT (OUTPATIENT)
Dept: OUTPATIENT SERVICES | Facility: HOSPITAL | Age: 74
LOS: 1 days | Discharge: HOME | End: 2019-08-05

## 2019-08-05 DIAGNOSIS — Z86.711 PERSONAL HISTORY OF PULMONARY EMBOLISM: ICD-10-CM

## 2019-08-05 DIAGNOSIS — Z95.0 PRESENCE OF CARDIAC PACEMAKER: Chronic | ICD-10-CM

## 2019-08-12 ENCOUNTER — OUTPATIENT (OUTPATIENT)
Dept: OUTPATIENT SERVICES | Facility: HOSPITAL | Age: 74
LOS: 1 days | Discharge: HOME | End: 2019-08-12

## 2019-08-12 DIAGNOSIS — Z95.0 PRESENCE OF CARDIAC PACEMAKER: Chronic | ICD-10-CM

## 2019-08-12 DIAGNOSIS — Z79.01 LONG TERM (CURRENT) USE OF ANTICOAGULANTS: ICD-10-CM

## 2019-08-19 ENCOUNTER — OUTPATIENT (OUTPATIENT)
Dept: OUTPATIENT SERVICES | Facility: HOSPITAL | Age: 74
LOS: 1 days | Discharge: HOME | End: 2019-08-19

## 2019-08-19 DIAGNOSIS — Z95.0 PRESENCE OF CARDIAC PACEMAKER: Chronic | ICD-10-CM

## 2019-08-19 DIAGNOSIS — Z86.711 PERSONAL HISTORY OF PULMONARY EMBOLISM: ICD-10-CM

## 2019-08-22 ENCOUNTER — OUTPATIENT (OUTPATIENT)
Dept: OUTPATIENT SERVICES | Facility: HOSPITAL | Age: 74
LOS: 1 days | Discharge: HOME | End: 2019-08-22

## 2019-08-22 DIAGNOSIS — I82.91 CHRONIC EMBOLISM AND THROMBOSIS OF UNSPECIFIED VEIN: ICD-10-CM

## 2019-08-22 DIAGNOSIS — Z95.0 PRESENCE OF CARDIAC PACEMAKER: Chronic | ICD-10-CM

## 2019-08-24 ENCOUNTER — OUTPATIENT (OUTPATIENT)
Dept: OUTPATIENT SERVICES | Facility: HOSPITAL | Age: 74
LOS: 1 days | Discharge: HOME | End: 2019-08-24

## 2019-08-24 DIAGNOSIS — Z95.0 PRESENCE OF CARDIAC PACEMAKER: Chronic | ICD-10-CM

## 2019-08-24 DIAGNOSIS — I82.91 CHRONIC EMBOLISM AND THROMBOSIS OF UNSPECIFIED VEIN: ICD-10-CM

## 2019-08-26 ENCOUNTER — OUTPATIENT (OUTPATIENT)
Dept: OUTPATIENT SERVICES | Facility: HOSPITAL | Age: 74
LOS: 1 days | Discharge: HOME | End: 2019-08-26

## 2019-08-26 DIAGNOSIS — Z79.01 LONG TERM (CURRENT) USE OF ANTICOAGULANTS: ICD-10-CM

## 2019-08-26 DIAGNOSIS — Z95.0 PRESENCE OF CARDIAC PACEMAKER: Chronic | ICD-10-CM

## 2019-09-03 ENCOUNTER — OUTPATIENT (OUTPATIENT)
Dept: OUTPATIENT SERVICES | Facility: HOSPITAL | Age: 74
LOS: 1 days | Discharge: HOME | End: 2019-09-03

## 2019-09-03 DIAGNOSIS — Z95.0 PRESENCE OF CARDIAC PACEMAKER: Chronic | ICD-10-CM

## 2019-09-03 DIAGNOSIS — I82.91 CHRONIC EMBOLISM AND THROMBOSIS OF UNSPECIFIED VEIN: ICD-10-CM

## 2019-09-05 ENCOUNTER — OUTPATIENT (OUTPATIENT)
Dept: OUTPATIENT SERVICES | Facility: HOSPITAL | Age: 74
LOS: 1 days | Discharge: HOME | End: 2019-09-05

## 2019-09-05 DIAGNOSIS — I82.91 CHRONIC EMBOLISM AND THROMBOSIS OF UNSPECIFIED VEIN: ICD-10-CM

## 2019-09-05 DIAGNOSIS — Z95.0 PRESENCE OF CARDIAC PACEMAKER: Chronic | ICD-10-CM

## 2019-09-09 ENCOUNTER — OUTPATIENT (OUTPATIENT)
Dept: OUTPATIENT SERVICES | Facility: HOSPITAL | Age: 74
LOS: 1 days | Discharge: HOME | End: 2019-09-09

## 2019-09-09 DIAGNOSIS — Z95.0 PRESENCE OF CARDIAC PACEMAKER: Chronic | ICD-10-CM

## 2019-09-09 DIAGNOSIS — Z79.01 LONG TERM (CURRENT) USE OF ANTICOAGULANTS: ICD-10-CM

## 2019-09-16 ENCOUNTER — OUTPATIENT (OUTPATIENT)
Dept: OUTPATIENT SERVICES | Facility: HOSPITAL | Age: 74
LOS: 1 days | Discharge: HOME | End: 2019-09-16

## 2019-09-16 DIAGNOSIS — Z95.0 PRESENCE OF CARDIAC PACEMAKER: Chronic | ICD-10-CM

## 2019-09-16 DIAGNOSIS — Z86.711 PERSONAL HISTORY OF PULMONARY EMBOLISM: ICD-10-CM

## 2019-09-17 ENCOUNTER — OUTPATIENT (OUTPATIENT)
Dept: OUTPATIENT SERVICES | Facility: HOSPITAL | Age: 74
LOS: 1 days | Discharge: HOME | End: 2019-09-17

## 2019-09-17 DIAGNOSIS — I82.91 CHRONIC EMBOLISM AND THROMBOSIS OF UNSPECIFIED VEIN: ICD-10-CM

## 2019-09-17 DIAGNOSIS — Z95.0 PRESENCE OF CARDIAC PACEMAKER: Chronic | ICD-10-CM

## 2019-09-20 ENCOUNTER — APPOINTMENT (OUTPATIENT)
Dept: CARDIOLOGY | Facility: CLINIC | Age: 74
End: 2019-09-20
Payer: MEDICARE

## 2019-09-20 VITALS — WEIGHT: 216 LBS | BODY MASS INDEX: 35.94 KG/M2

## 2019-09-20 VITALS — HEART RATE: 68 BPM | DIASTOLIC BLOOD PRESSURE: 60 MMHG | SYSTOLIC BLOOD PRESSURE: 98 MMHG

## 2019-09-20 DIAGNOSIS — E78.5 HYPERLIPIDEMIA, UNSPECIFIED: ICD-10-CM

## 2019-09-20 DIAGNOSIS — Z45.018 ENCOUNTER FOR ADJUSTMENT AND MANAGEMENT OF OTHER PART OF CARDIAC PACEMAKER: ICD-10-CM

## 2019-09-20 DIAGNOSIS — Z95.0 PRESENCE OF CARDIAC PACEMAKER: ICD-10-CM

## 2019-09-20 PROCEDURE — 93280 PM DEVICE PROGR EVAL DUAL: CPT

## 2019-09-20 PROCEDURE — 99213 OFFICE O/P EST LOW 20 MIN: CPT

## 2019-09-20 RX ORDER — MIRTAZAPINE 45 MG/1
45 TABLET, FILM COATED ORAL
Refills: 0 | Status: ACTIVE | COMMUNITY

## 2019-09-20 RX ORDER — DOCUSATE SODIUM 100 MG/1
100 TABLET ORAL
Refills: 0 | Status: DISCONTINUED | COMMUNITY
End: 2019-09-20

## 2019-09-20 RX ORDER — MIRTAZAPINE 15 MG/1
15 TABLET, FILM COATED ORAL
Qty: 7 | Refills: 0 | Status: DISCONTINUED | COMMUNITY
Start: 2018-02-12 | End: 2019-09-20

## 2019-09-20 NOTE — PHYSICAL EXAM
[General Appearance - Well Developed] : well developed [Normal Appearance] : normal appearance [General Appearance - Well Nourished] : well nourished [Well Groomed] : well groomed [General Appearance - In No Acute Distress] : no acute distress [Heart Rate And Rhythm] : heart rate and rhythm were normal [No Deformities] : no deformities [Murmurs] : no murmurs present [Heart Sounds] : normal S1 and S2 [Respiration, Rhythm And Depth] : normal respiratory rhythm and effort [Exaggerated Use Of Accessory Muscles For Inspiration] : no accessory muscle use [Auscultation Breath Sounds / Voice Sounds] : lungs were clear to auscultation bilaterally [Dry] : dry [Clean] : clean [Well-Healed] : well-healed [Abdomen Tenderness] : non-tender [Abdomen Soft] : soft [Nail Clubbing] : no clubbing of the fingernails [Abdomen Mass (___ Cm)] : no abdominal mass palpated [Cyanosis, Localized] : no localized cyanosis [] : no ischemic changes [Petechial Hemorrhages (___cm)] : no petechial hemorrhages

## 2019-09-21 ENCOUNTER — OUTPATIENT (OUTPATIENT)
Dept: OUTPATIENT SERVICES | Facility: HOSPITAL | Age: 74
LOS: 1 days | Discharge: HOME | End: 2019-09-21

## 2019-09-21 DIAGNOSIS — Z95.0 PRESENCE OF CARDIAC PACEMAKER: Chronic | ICD-10-CM

## 2019-09-21 DIAGNOSIS — I82.91 CHRONIC EMBOLISM AND THROMBOSIS OF UNSPECIFIED VEIN: ICD-10-CM

## 2019-09-23 ENCOUNTER — OUTPATIENT (OUTPATIENT)
Dept: OUTPATIENT SERVICES | Facility: HOSPITAL | Age: 74
LOS: 1 days | Discharge: HOME | End: 2019-09-23

## 2019-09-23 DIAGNOSIS — Z79.01 LONG TERM (CURRENT) USE OF ANTICOAGULANTS: ICD-10-CM

## 2019-09-23 DIAGNOSIS — Z95.0 PRESENCE OF CARDIAC PACEMAKER: Chronic | ICD-10-CM

## 2019-09-24 PROBLEM — Z45.018 ENCOUNTER FOR INTERROGATION OF CARDIAC PACEMAKER: Status: ACTIVE | Noted: 2019-09-24

## 2019-09-25 ENCOUNTER — OUTPATIENT (OUTPATIENT)
Dept: OUTPATIENT SERVICES | Facility: HOSPITAL | Age: 74
LOS: 1 days | Discharge: HOME | End: 2019-09-25

## 2019-09-25 DIAGNOSIS — Z95.0 PRESENCE OF CARDIAC PACEMAKER: Chronic | ICD-10-CM

## 2019-09-25 DIAGNOSIS — I48.91 UNSPECIFIED ATRIAL FIBRILLATION: ICD-10-CM

## 2019-09-28 ENCOUNTER — OUTPATIENT (OUTPATIENT)
Dept: OUTPATIENT SERVICES | Facility: HOSPITAL | Age: 74
LOS: 1 days | Discharge: HOME | End: 2019-09-28

## 2019-09-28 DIAGNOSIS — Z95.0 PRESENCE OF CARDIAC PACEMAKER: Chronic | ICD-10-CM

## 2019-09-28 DIAGNOSIS — I82.91 CHRONIC EMBOLISM AND THROMBOSIS OF UNSPECIFIED VEIN: ICD-10-CM

## 2019-09-30 ENCOUNTER — OUTPATIENT (OUTPATIENT)
Dept: OUTPATIENT SERVICES | Facility: HOSPITAL | Age: 74
LOS: 1 days | Discharge: HOME | End: 2019-09-30

## 2019-09-30 DIAGNOSIS — Z86.711 PERSONAL HISTORY OF PULMONARY EMBOLISM: ICD-10-CM

## 2019-09-30 DIAGNOSIS — Z95.0 PRESENCE OF CARDIAC PACEMAKER: Chronic | ICD-10-CM

## 2019-10-03 ENCOUNTER — APPOINTMENT (OUTPATIENT)
Dept: CARDIOLOGY | Facility: CLINIC | Age: 74
End: 2019-10-03
Payer: MEDICARE

## 2019-10-03 PROCEDURE — 93294 REM INTERROG EVL PM/LDLS PM: CPT

## 2019-10-03 PROCEDURE — 93296 REM INTERROG EVL PM/IDS: CPT

## 2019-10-07 ENCOUNTER — OUTPATIENT (OUTPATIENT)
Dept: OUTPATIENT SERVICES | Facility: HOSPITAL | Age: 74
LOS: 1 days | Discharge: HOME | End: 2019-10-07

## 2019-10-07 DIAGNOSIS — Z79.01 LONG TERM (CURRENT) USE OF ANTICOAGULANTS: ICD-10-CM

## 2019-10-07 DIAGNOSIS — Z95.0 PRESENCE OF CARDIAC PACEMAKER: Chronic | ICD-10-CM

## 2019-10-14 ENCOUNTER — OUTPATIENT (OUTPATIENT)
Dept: OUTPATIENT SERVICES | Facility: HOSPITAL | Age: 74
LOS: 1 days | Discharge: HOME | End: 2019-10-14

## 2019-10-14 DIAGNOSIS — Z95.0 PRESENCE OF CARDIAC PACEMAKER: Chronic | ICD-10-CM

## 2019-10-14 DIAGNOSIS — Z86.711 PERSONAL HISTORY OF PULMONARY EMBOLISM: ICD-10-CM

## 2019-10-16 ENCOUNTER — OUTPATIENT (OUTPATIENT)
Dept: OUTPATIENT SERVICES | Facility: HOSPITAL | Age: 74
LOS: 1 days | Discharge: HOME | End: 2019-10-16

## 2019-10-16 DIAGNOSIS — Z95.0 PRESENCE OF CARDIAC PACEMAKER: Chronic | ICD-10-CM

## 2019-10-16 DIAGNOSIS — I48.91 UNSPECIFIED ATRIAL FIBRILLATION: ICD-10-CM

## 2019-10-21 ENCOUNTER — OUTPATIENT (OUTPATIENT)
Dept: OUTPATIENT SERVICES | Facility: HOSPITAL | Age: 74
LOS: 1 days | Discharge: HOME | End: 2019-10-21

## 2019-10-21 DIAGNOSIS — Z79.01 LONG TERM (CURRENT) USE OF ANTICOAGULANTS: ICD-10-CM

## 2019-10-21 DIAGNOSIS — Z95.0 PRESENCE OF CARDIAC PACEMAKER: Chronic | ICD-10-CM

## 2019-10-24 ENCOUNTER — OUTPATIENT (OUTPATIENT)
Dept: OUTPATIENT SERVICES | Facility: HOSPITAL | Age: 74
LOS: 1 days | Discharge: HOME | End: 2019-10-24

## 2019-10-24 DIAGNOSIS — Z95.0 PRESENCE OF CARDIAC PACEMAKER: Chronic | ICD-10-CM

## 2019-10-24 DIAGNOSIS — I82.91 CHRONIC EMBOLISM AND THROMBOSIS OF UNSPECIFIED VEIN: ICD-10-CM

## 2019-10-25 ENCOUNTER — OUTPATIENT (OUTPATIENT)
Dept: OUTPATIENT SERVICES | Facility: HOSPITAL | Age: 74
LOS: 1 days | Discharge: HOME | End: 2019-10-25

## 2019-10-25 DIAGNOSIS — I82.91 CHRONIC EMBOLISM AND THROMBOSIS OF UNSPECIFIED VEIN: ICD-10-CM

## 2019-10-25 DIAGNOSIS — Z95.0 PRESENCE OF CARDIAC PACEMAKER: Chronic | ICD-10-CM

## 2019-10-28 ENCOUNTER — OUTPATIENT (OUTPATIENT)
Dept: OUTPATIENT SERVICES | Facility: HOSPITAL | Age: 74
LOS: 1 days | Discharge: HOME | End: 2019-10-28

## 2019-10-28 DIAGNOSIS — Z86.711 PERSONAL HISTORY OF PULMONARY EMBOLISM: ICD-10-CM

## 2019-10-28 DIAGNOSIS — Z95.0 PRESENCE OF CARDIAC PACEMAKER: Chronic | ICD-10-CM

## 2019-11-04 ENCOUNTER — OUTPATIENT (OUTPATIENT)
Dept: OUTPATIENT SERVICES | Facility: HOSPITAL | Age: 74
LOS: 1 days | Discharge: HOME | End: 2019-11-04

## 2019-11-04 DIAGNOSIS — Z86.711 PERSONAL HISTORY OF PULMONARY EMBOLISM: ICD-10-CM

## 2019-11-04 DIAGNOSIS — Z95.0 PRESENCE OF CARDIAC PACEMAKER: Chronic | ICD-10-CM

## 2019-11-11 ENCOUNTER — OUTPATIENT (OUTPATIENT)
Dept: OUTPATIENT SERVICES | Facility: HOSPITAL | Age: 74
LOS: 1 days | Discharge: HOME | End: 2019-11-11

## 2019-11-11 DIAGNOSIS — Z95.0 PRESENCE OF CARDIAC PACEMAKER: Chronic | ICD-10-CM

## 2019-11-11 DIAGNOSIS — Z86.711 PERSONAL HISTORY OF PULMONARY EMBOLISM: ICD-10-CM

## 2019-11-18 ENCOUNTER — OUTPATIENT (OUTPATIENT)
Dept: OUTPATIENT SERVICES | Facility: HOSPITAL | Age: 74
LOS: 1 days | Discharge: HOME | End: 2019-11-18

## 2019-11-18 DIAGNOSIS — Z95.0 PRESENCE OF CARDIAC PACEMAKER: Chronic | ICD-10-CM

## 2019-11-18 DIAGNOSIS — Z79.01 LONG TERM (CURRENT) USE OF ANTICOAGULANTS: ICD-10-CM

## 2019-11-19 RX ORDER — WARFARIN 5 MG/1
5 TABLET ORAL
Qty: 7 | Refills: 0 | Status: ACTIVE | COMMUNITY
Start: 2018-02-12

## 2019-11-19 RX ORDER — LISINOPRIL 2.5 MG/1
2.5 TABLET ORAL
Qty: 90 | Refills: 3 | Status: ACTIVE | COMMUNITY
Start: 2017-04-03

## 2019-11-19 NOTE — PROCEDURE
[No] : not [NSR] : normal sinus rhythm [Pacemaker] : pacemaker [Magnet Rate: ___ Ppm] : magnet rate was [unfilled] Ppm [DDD] : DDD [Longevity: ___ months] : The estimated remaining battery life is [unfilled] months [Lead Imp:  ___ohms] : lead impedance was [unfilled] ohms [Sensing Amplitude ___mv] : sensing amplitude was [unfilled] mv [___V @] : [unfilled] V [___ ms] : [unfilled] ms [Asense-Vpace ___ %] : Asense-Vpace [unfilled]% [Apace-Vsense ___ %] : Apace-Vsense [unfilled]% [Programmed for Longevity] : output reprogrammed for improved battery longevity [de-identified] : L311 [de-identified] : [facility]  [de-identified] : [id number] [de-identified] : N/A [de-identified] : Normal device function

## 2019-11-19 NOTE — HISTORY OF PRESENT ILLNESS
[None] : The patient complains of no symptoms [Chest Pain] : chest pain [Palpitations] : no palpitations [SOB] : no dyspnea [Erythema at Site] : no erythema at device site [Swelling at Site] : no swelling at device site [de-identified] : 73 years old with  PMH HTN, HLD, DM, symptomatic bradycardia, with permanent dual chamber pacemaker, recent battery change in 7/1/2019\par \par here for device check and incision check

## 2019-11-19 NOTE — ASSESSMENT
[FreeTextEntry1] : \par s/p Pacemaker generator change on 7/1/2019\par DEvice interrogation showed stable parameter\par No event\par Incision site healed well\par \par Lives in a group home\par ON remote [id number]- transmitting\par \par Follow in office in 1 year\par \par \par

## 2019-11-21 ENCOUNTER — OUTPATIENT (OUTPATIENT)
Dept: OUTPATIENT SERVICES | Facility: HOSPITAL | Age: 74
LOS: 1 days | Discharge: HOME | End: 2019-11-21

## 2019-11-21 DIAGNOSIS — Z95.0 PRESENCE OF CARDIAC PACEMAKER: Chronic | ICD-10-CM

## 2019-11-21 DIAGNOSIS — I82.91 CHRONIC EMBOLISM AND THROMBOSIS OF UNSPECIFIED VEIN: ICD-10-CM

## 2019-11-25 ENCOUNTER — OUTPATIENT (OUTPATIENT)
Dept: OUTPATIENT SERVICES | Facility: HOSPITAL | Age: 74
LOS: 1 days | Discharge: HOME | End: 2019-11-25

## 2019-11-25 DIAGNOSIS — Z86.711 PERSONAL HISTORY OF PULMONARY EMBOLISM: ICD-10-CM

## 2019-11-25 DIAGNOSIS — Z95.0 PRESENCE OF CARDIAC PACEMAKER: Chronic | ICD-10-CM

## 2019-12-02 ENCOUNTER — OUTPATIENT (OUTPATIENT)
Dept: OUTPATIENT SERVICES | Facility: HOSPITAL | Age: 74
LOS: 1 days | Discharge: HOME | End: 2019-12-02

## 2019-12-02 DIAGNOSIS — Z86.711 PERSONAL HISTORY OF PULMONARY EMBOLISM: ICD-10-CM

## 2019-12-02 DIAGNOSIS — Z95.0 PRESENCE OF CARDIAC PACEMAKER: Chronic | ICD-10-CM

## 2019-12-04 ENCOUNTER — OUTPATIENT (OUTPATIENT)
Dept: OUTPATIENT SERVICES | Facility: HOSPITAL | Age: 74
LOS: 1 days | Discharge: HOME | End: 2019-12-04

## 2019-12-04 DIAGNOSIS — I82.91 CHRONIC EMBOLISM AND THROMBOSIS OF UNSPECIFIED VEIN: ICD-10-CM

## 2019-12-04 DIAGNOSIS — Z95.0 PRESENCE OF CARDIAC PACEMAKER: Chronic | ICD-10-CM

## 2019-12-06 ENCOUNTER — OUTPATIENT (OUTPATIENT)
Dept: OUTPATIENT SERVICES | Facility: HOSPITAL | Age: 74
LOS: 1 days | Discharge: HOME | End: 2019-12-06

## 2019-12-06 DIAGNOSIS — I82.91 CHRONIC EMBOLISM AND THROMBOSIS OF UNSPECIFIED VEIN: ICD-10-CM

## 2019-12-06 DIAGNOSIS — Z95.0 PRESENCE OF CARDIAC PACEMAKER: Chronic | ICD-10-CM

## 2019-12-09 ENCOUNTER — OUTPATIENT (OUTPATIENT)
Dept: OUTPATIENT SERVICES | Facility: HOSPITAL | Age: 74
LOS: 1 days | Discharge: HOME | End: 2019-12-09

## 2019-12-09 DIAGNOSIS — Z86.711 PERSONAL HISTORY OF PULMONARY EMBOLISM: ICD-10-CM

## 2019-12-09 DIAGNOSIS — Z95.0 PRESENCE OF CARDIAC PACEMAKER: Chronic | ICD-10-CM

## 2019-12-16 ENCOUNTER — OUTPATIENT (OUTPATIENT)
Dept: OUTPATIENT SERVICES | Facility: HOSPITAL | Age: 74
LOS: 1 days | Discharge: HOME | End: 2019-12-16

## 2019-12-16 DIAGNOSIS — Z95.0 PRESENCE OF CARDIAC PACEMAKER: Chronic | ICD-10-CM

## 2019-12-16 DIAGNOSIS — Z79.01 LONG TERM (CURRENT) USE OF ANTICOAGULANTS: ICD-10-CM

## 2019-12-23 ENCOUNTER — OUTPATIENT (OUTPATIENT)
Dept: OUTPATIENT SERVICES | Facility: HOSPITAL | Age: 74
LOS: 1 days | Discharge: HOME | End: 2019-12-23

## 2019-12-23 DIAGNOSIS — Z86.711 PERSONAL HISTORY OF PULMONARY EMBOLISM: ICD-10-CM

## 2019-12-23 DIAGNOSIS — Z95.0 PRESENCE OF CARDIAC PACEMAKER: Chronic | ICD-10-CM

## 2019-12-24 ENCOUNTER — OUTPATIENT (OUTPATIENT)
Dept: OUTPATIENT SERVICES | Facility: HOSPITAL | Age: 74
LOS: 1 days | Discharge: HOME | End: 2019-12-24

## 2019-12-24 DIAGNOSIS — I82.91 CHRONIC EMBOLISM AND THROMBOSIS OF UNSPECIFIED VEIN: ICD-10-CM

## 2019-12-24 DIAGNOSIS — Z95.0 PRESENCE OF CARDIAC PACEMAKER: Chronic | ICD-10-CM

## 2019-12-26 ENCOUNTER — OUTPATIENT (OUTPATIENT)
Dept: OUTPATIENT SERVICES | Facility: HOSPITAL | Age: 74
LOS: 1 days | Discharge: HOME | End: 2019-12-26

## 2019-12-26 DIAGNOSIS — Z95.0 PRESENCE OF CARDIAC PACEMAKER: Chronic | ICD-10-CM

## 2019-12-26 DIAGNOSIS — I82.91 CHRONIC EMBOLISM AND THROMBOSIS OF UNSPECIFIED VEIN: ICD-10-CM

## 2019-12-28 ENCOUNTER — OUTPATIENT (OUTPATIENT)
Dept: OUTPATIENT SERVICES | Facility: HOSPITAL | Age: 74
LOS: 1 days | Discharge: HOME | End: 2019-12-28

## 2019-12-28 DIAGNOSIS — I82.91 CHRONIC EMBOLISM AND THROMBOSIS OF UNSPECIFIED VEIN: ICD-10-CM

## 2019-12-28 DIAGNOSIS — Z95.0 PRESENCE OF CARDIAC PACEMAKER: Chronic | ICD-10-CM

## 2019-12-30 ENCOUNTER — OUTPATIENT (OUTPATIENT)
Dept: OUTPATIENT SERVICES | Facility: HOSPITAL | Age: 74
LOS: 1 days | Discharge: HOME | End: 2019-12-30

## 2019-12-30 DIAGNOSIS — Z79.01 LONG TERM (CURRENT) USE OF ANTICOAGULANTS: ICD-10-CM

## 2019-12-30 DIAGNOSIS — Z95.0 PRESENCE OF CARDIAC PACEMAKER: Chronic | ICD-10-CM

## 2020-01-03 ENCOUNTER — APPOINTMENT (OUTPATIENT)
Dept: CARDIOLOGY | Facility: CLINIC | Age: 75
End: 2020-01-03
Payer: MEDICARE

## 2020-01-03 PROCEDURE — 93294 REM INTERROG EVL PM/LDLS PM: CPT

## 2020-01-03 PROCEDURE — 93296 REM INTERROG EVL PM/IDS: CPT

## 2020-04-03 ENCOUNTER — APPOINTMENT (OUTPATIENT)
Dept: CARDIOLOGY | Facility: CLINIC | Age: 75
End: 2020-04-03
Payer: MEDICARE

## 2020-04-03 PROCEDURE — 93296 REM INTERROG EVL PM/IDS: CPT

## 2020-04-03 PROCEDURE — 93294 REM INTERROG EVL PM/LDLS PM: CPT

## 2020-07-02 ENCOUNTER — APPOINTMENT (OUTPATIENT)
Dept: CARDIOLOGY | Facility: CLINIC | Age: 75
End: 2020-07-02
Payer: MEDICARE

## 2020-07-02 PROCEDURE — 93294 REM INTERROG EVL PM/LDLS PM: CPT

## 2020-07-02 PROCEDURE — 93296 REM INTERROG EVL PM/IDS: CPT

## 2020-09-18 ENCOUNTER — APPOINTMENT (OUTPATIENT)
Dept: CARDIOLOGY | Facility: CLINIC | Age: 75
End: 2020-09-18
Payer: MEDICARE

## 2020-09-18 VITALS
TEMPERATURE: 97.4 F | WEIGHT: 263 LBS | DIASTOLIC BLOOD PRESSURE: 70 MMHG | HEIGHT: 65 IN | SYSTOLIC BLOOD PRESSURE: 128 MMHG | BODY MASS INDEX: 43.82 KG/M2

## 2020-09-18 DIAGNOSIS — R00.1 BRADYCARDIA, UNSPECIFIED: ICD-10-CM

## 2020-09-18 DIAGNOSIS — I10 ESSENTIAL (PRIMARY) HYPERTENSION: ICD-10-CM

## 2020-09-18 PROCEDURE — 93280 PM DEVICE PROGR EVAL DUAL: CPT

## 2020-09-18 PROCEDURE — 99213 OFFICE O/P EST LOW 20 MIN: CPT

## 2020-09-18 RX ORDER — CHOLECALCIFEROL (VITAMIN D3) 1250 MCG
1.25 MG CAPSULE ORAL
Refills: 0 | Status: ACTIVE | COMMUNITY

## 2020-09-18 RX ORDER — MV-MIN/FOLIC/VIT K/LYCOP/COQ10 200-100MCG
CAPSULE ORAL DAILY
Refills: 0 | Status: ACTIVE | COMMUNITY

## 2020-09-18 RX ORDER — ASPIRIN 81 MG/1
81 TABLET, COATED ORAL
Refills: 0 | Status: COMPLETED | COMMUNITY
End: 2020-09-18

## 2020-09-18 RX ORDER — UBIDECARENONE 30 MG
CAPSULE ORAL DAILY
Refills: 0 | Status: ACTIVE | COMMUNITY

## 2020-09-18 RX ORDER — FUROSEMIDE 40 MG/1
40 TABLET ORAL DAILY
Refills: 0 | Status: ACTIVE | COMMUNITY
Start: 2018-02-23

## 2020-09-18 RX ORDER — ASPIRIN 81 MG/1
81 TABLET, COATED ORAL DAILY
Refills: 0 | Status: ACTIVE | COMMUNITY

## 2020-09-18 RX ORDER — ACETAMINOPHEN 325 MG/1
325 TABLET, FILM COATED ORAL EVERY 6 HOURS
Refills: 0 | Status: ACTIVE | COMMUNITY

## 2020-09-18 RX ORDER — BUSPIRONE HYDROCHLORIDE 10 MG/1
10 TABLET ORAL
Qty: 60 | Refills: 0 | Status: COMPLETED | COMMUNITY
Start: 2018-02-06 | End: 2020-09-18

## 2020-09-18 RX ORDER — FUROSEMIDE 40 MG/1
40 TABLET ORAL
Refills: 0 | Status: COMPLETED | COMMUNITY
End: 2020-09-18

## 2020-09-18 RX ORDER — SENNOSIDES 8.6 MG/1
8.6 CAPSULE, GELATIN COATED ORAL DAILY
Refills: 0 | Status: ACTIVE | COMMUNITY

## 2020-09-18 RX ORDER — PANTOPRAZOLE 20 MG/1
20 TABLET, DELAYED RELEASE ORAL DAILY
Refills: 0 | Status: ACTIVE | COMMUNITY

## 2020-09-18 RX ORDER — BUSPIRONE HYDROCHLORIDE 5 MG/1
5 TABLET ORAL 3 TIMES DAILY
Refills: 0 | Status: ACTIVE | COMMUNITY

## 2020-09-18 RX ORDER — POTASSIUM CHLORIDE 750 MG/1
10 TABLET, FILM COATED, EXTENDED RELEASE ORAL
Refills: 0 | Status: COMPLETED | COMMUNITY
End: 2020-09-18

## 2020-09-18 NOTE — ASSESSMENT
[FreeTextEntry1] : \par DEvice interrogation showed stable parameter\par No event\par Incision site healed well\par \par Lives in a group home\par ON remote mnitoring- transmitting\par \par \par \par \par

## 2020-09-18 NOTE — PROCEDURE
[No] : not [NSR] : normal sinus rhythm [Pacemaker] : pacemaker [DDD] : DDD [Magnet Rate: ___ Ppm] : magnet rate was [unfilled] Ppm [Longevity: ___ months] : The estimated remaining battery life is [unfilled] months [Lead Imp:  ___ohms] : lead impedance was [unfilled] ohms [Sensing Amplitude ___mv] : sensing amplitude was [unfilled] mv [___V @] : [unfilled] V [___ ms] : [unfilled] ms [Programmed for Longevity] : output reprogrammed for improved battery longevity [Asense-Vpace ___ %] : Asense-Vpace [unfilled]% [Apace-Vsense ___ %] : Apace-Vsense [unfilled]% [de-identified] : [facility]  [de-identified] : L311 [de-identified] : [id number] [de-identified] : N/A [de-identified] : Normal device function

## 2020-09-18 NOTE — HISTORY OF PRESENT ILLNESS
[None] : The patient complains of no symptoms [Palpitations] : no palpitations [SOB] : no dyspnea [Chest Pain] : chest pain [Erythema at Site] : no erythema at device site [Swelling at Site] : no swelling at device site [de-identified] : 74 years old with  PMH HTN, HLD, DM, symptomatic bradycardia, with permanent dual chamber pacemaker, recent battery change in 7/1/2019\par \par

## 2020-10-02 ENCOUNTER — APPOINTMENT (OUTPATIENT)
Dept: CARDIOLOGY | Facility: CLINIC | Age: 75
End: 2020-10-02
Payer: MEDICARE

## 2020-10-02 PROCEDURE — 93294 REM INTERROG EVL PM/LDLS PM: CPT

## 2020-10-02 PROCEDURE — 93296 REM INTERROG EVL PM/IDS: CPT

## 2021-01-07 ENCOUNTER — APPOINTMENT (OUTPATIENT)
Dept: CARDIOLOGY | Facility: CLINIC | Age: 76
End: 2021-01-07
Payer: MEDICARE

## 2021-01-07 PROCEDURE — 93294 REM INTERROG EVL PM/LDLS PM: CPT

## 2021-01-07 PROCEDURE — 93296 REM INTERROG EVL PM/IDS: CPT

## 2021-04-09 ENCOUNTER — NON-APPOINTMENT (OUTPATIENT)
Age: 76
End: 2021-04-09

## 2021-04-09 ENCOUNTER — APPOINTMENT (OUTPATIENT)
Dept: CARDIOLOGY | Facility: CLINIC | Age: 76
End: 2021-04-09
Payer: MEDICARE

## 2021-04-09 PROCEDURE — 93296 REM INTERROG EVL PM/IDS: CPT

## 2021-04-09 PROCEDURE — 93294 REM INTERROG EVL PM/LDLS PM: CPT

## 2021-07-15 ENCOUNTER — NON-APPOINTMENT (OUTPATIENT)
Age: 76
End: 2021-07-15

## 2021-07-15 ENCOUNTER — APPOINTMENT (OUTPATIENT)
Dept: CARDIOLOGY | Facility: CLINIC | Age: 76
End: 2021-07-15
Payer: MEDICARE

## 2021-07-15 PROCEDURE — 93296 REM INTERROG EVL PM/IDS: CPT

## 2021-07-15 PROCEDURE — 93294 REM INTERROG EVL PM/LDLS PM: CPT

## 2021-10-14 ENCOUNTER — APPOINTMENT (OUTPATIENT)
Dept: CARDIOLOGY | Facility: CLINIC | Age: 76
End: 2021-10-14
Payer: MEDICARE

## 2021-10-14 ENCOUNTER — NON-APPOINTMENT (OUTPATIENT)
Age: 76
End: 2021-10-14

## 2021-10-14 PROCEDURE — 93294 REM INTERROG EVL PM/LDLS PM: CPT

## 2021-10-14 PROCEDURE — 93296 REM INTERROG EVL PM/IDS: CPT | Mod: NC

## 2022-01-13 ENCOUNTER — NON-APPOINTMENT (OUTPATIENT)
Age: 77
End: 2022-01-13

## 2022-01-13 ENCOUNTER — APPOINTMENT (OUTPATIENT)
Dept: CARDIOLOGY | Facility: CLINIC | Age: 77
End: 2022-01-13
Payer: MEDICARE

## 2022-01-13 PROCEDURE — 93296 REM INTERROG EVL PM/IDS: CPT

## 2022-01-13 PROCEDURE — 93294 REM INTERROG EVL PM/LDLS PM: CPT

## 2022-03-18 NOTE — PROGRESS NOTE ADULT - ATTENDING COMMENTS
96 Covering for Dr. Wick    pt with history of sinus bradycardia s/p DC PPM with battery at EOL. Pt admitted for generator change.     Recommend  - Continue coumadin with goal INR of 2 for DVT  - Admit and monitor on tele.   - Monitor lytes and keep K>4 and Mg>2  - Check UA and UCx  - NPO after midnight on Sun. No heparin or Lovenox after midnight on Sun. Can cont coumadin    Will plan generator replacement for Mon  We discussed the risks/benefits/alternatives, nature of procedure, and follow up care after device is implanted. We also discussed remote monitoring in details. Patient is in agreement with proceeding with device generator change. We discussed the risks including but not limited to bleeding, hematoma, and infection. Patient expressed understanding of the discussion. I answered all questions in detail. Patient would like to proceed with generator replacement on Mon.

## 2022-04-14 ENCOUNTER — APPOINTMENT (OUTPATIENT)
Dept: CARDIOLOGY | Facility: CLINIC | Age: 77
End: 2022-04-14
Payer: MEDICARE

## 2022-04-14 ENCOUNTER — NON-APPOINTMENT (OUTPATIENT)
Age: 77
End: 2022-04-14

## 2022-04-14 PROCEDURE — 93296 REM INTERROG EVL PM/IDS: CPT

## 2022-04-14 PROCEDURE — 93294 REM INTERROG EVL PM/LDLS PM: CPT

## 2022-07-14 ENCOUNTER — APPOINTMENT (OUTPATIENT)
Dept: CARDIOLOGY | Facility: CLINIC | Age: 77
End: 2022-07-14

## 2022-07-14 ENCOUNTER — NON-APPOINTMENT (OUTPATIENT)
Age: 77
End: 2022-07-14

## 2022-07-14 PROCEDURE — 93296 REM INTERROG EVL PM/IDS: CPT

## 2022-07-14 PROCEDURE — 93294 REM INTERROG EVL PM/LDLS PM: CPT

## 2022-10-13 ENCOUNTER — APPOINTMENT (OUTPATIENT)
Dept: CARDIOLOGY | Facility: CLINIC | Age: 77
End: 2022-10-13

## 2022-10-13 ENCOUNTER — NON-APPOINTMENT (OUTPATIENT)
Age: 77
End: 2022-10-13

## 2022-10-13 PROCEDURE — 93294 REM INTERROG EVL PM/LDLS PM: CPT

## 2022-10-13 PROCEDURE — 93296 REM INTERROG EVL PM/IDS: CPT

## 2023-01-12 ENCOUNTER — APPOINTMENT (OUTPATIENT)
Dept: CARDIOLOGY | Facility: CLINIC | Age: 78
End: 2023-01-12
Payer: MEDICARE

## 2023-01-12 ENCOUNTER — NON-APPOINTMENT (OUTPATIENT)
Age: 78
End: 2023-01-12

## 2023-01-12 PROCEDURE — 93294 REM INTERROG EVL PM/LDLS PM: CPT

## 2023-01-12 PROCEDURE — 93296 REM INTERROG EVL PM/IDS: CPT

## 2023-04-13 ENCOUNTER — NON-APPOINTMENT (OUTPATIENT)
Age: 78
End: 2023-04-13

## 2023-04-13 ENCOUNTER — APPOINTMENT (OUTPATIENT)
Dept: CARDIOLOGY | Facility: CLINIC | Age: 78
End: 2023-04-13
Payer: MEDICARE

## 2023-04-13 PROCEDURE — 93294 REM INTERROG EVL PM/LDLS PM: CPT

## 2023-04-13 PROCEDURE — 93296 REM INTERROG EVL PM/IDS: CPT

## 2023-07-13 ENCOUNTER — NON-APPOINTMENT (OUTPATIENT)
Age: 78
End: 2023-07-13

## 2023-07-13 ENCOUNTER — APPOINTMENT (OUTPATIENT)
Dept: CARDIOLOGY | Facility: CLINIC | Age: 78
End: 2023-07-13
Payer: MEDICARE

## 2023-07-13 PROCEDURE — 93296 REM INTERROG EVL PM/IDS: CPT

## 2023-07-13 PROCEDURE — 93294 REM INTERROG EVL PM/LDLS PM: CPT

## 2023-10-12 ENCOUNTER — NON-APPOINTMENT (OUTPATIENT)
Age: 78
End: 2023-10-12

## 2023-10-12 ENCOUNTER — APPOINTMENT (OUTPATIENT)
Dept: CARDIOLOGY | Facility: CLINIC | Age: 78
End: 2023-10-12
Payer: MEDICARE

## 2023-10-12 PROCEDURE — 93294 REM INTERROG EVL PM/LDLS PM: CPT

## 2023-10-12 PROCEDURE — 93296 REM INTERROG EVL PM/IDS: CPT

## 2024-01-11 ENCOUNTER — NON-APPOINTMENT (OUTPATIENT)
Age: 79
End: 2024-01-11

## 2024-01-11 ENCOUNTER — APPOINTMENT (OUTPATIENT)
Dept: CARDIOLOGY | Facility: CLINIC | Age: 79
End: 2024-01-11
Payer: MEDICARE

## 2024-01-11 PROCEDURE — 93294 REM INTERROG EVL PM/LDLS PM: CPT

## 2024-01-11 PROCEDURE — 93296 REM INTERROG EVL PM/IDS: CPT

## 2024-04-11 ENCOUNTER — APPOINTMENT (OUTPATIENT)
Dept: CARDIOLOGY | Facility: CLINIC | Age: 79
End: 2024-04-11
Payer: MEDICARE

## 2024-04-11 ENCOUNTER — NON-APPOINTMENT (OUTPATIENT)
Age: 79
End: 2024-04-11

## 2024-04-11 PROCEDURE — 93294 REM INTERROG EVL PM/LDLS PM: CPT

## 2024-04-11 PROCEDURE — 93296 REM INTERROG EVL PM/IDS: CPT

## 2024-07-11 ENCOUNTER — NON-APPOINTMENT (OUTPATIENT)
Age: 79
End: 2024-07-11

## 2024-07-11 ENCOUNTER — APPOINTMENT (OUTPATIENT)
Dept: CARDIOLOGY | Facility: CLINIC | Age: 79
End: 2024-07-11

## 2024-07-11 PROCEDURE — 93294 REM INTERROG EVL PM/LDLS PM: CPT

## 2024-07-11 PROCEDURE — 93296 REM INTERROG EVL PM/IDS: CPT

## 2024-10-10 ENCOUNTER — APPOINTMENT (OUTPATIENT)
Dept: CARDIOLOGY | Facility: CLINIC | Age: 79
End: 2024-10-10
Payer: MEDICARE

## 2024-10-10 ENCOUNTER — NON-APPOINTMENT (OUTPATIENT)
Age: 79
End: 2024-10-10

## 2024-10-10 PROCEDURE — 93294 REM INTERROG EVL PM/LDLS PM: CPT

## 2024-10-10 PROCEDURE — 93296 REM INTERROG EVL PM/IDS: CPT

## 2025-01-09 ENCOUNTER — NON-APPOINTMENT (OUTPATIENT)
Age: 80
End: 2025-01-09

## 2025-01-09 ENCOUNTER — APPOINTMENT (OUTPATIENT)
Dept: CARDIOLOGY | Facility: CLINIC | Age: 80
End: 2025-01-09
Payer: MEDICARE

## 2025-01-09 PROCEDURE — 93296 REM INTERROG EVL PM/IDS: CPT

## 2025-01-09 PROCEDURE — 93294 REM INTERROG EVL PM/LDLS PM: CPT

## 2025-04-10 ENCOUNTER — APPOINTMENT (OUTPATIENT)
Dept: CARDIOLOGY | Facility: CLINIC | Age: 80
End: 2025-04-10

## 2025-04-10 ENCOUNTER — NON-APPOINTMENT (OUTPATIENT)
Age: 80
End: 2025-04-10

## 2025-04-10 PROCEDURE — 93296 REM INTERROG EVL PM/IDS: CPT

## 2025-04-10 PROCEDURE — 93294 REM INTERROG EVL PM/LDLS PM: CPT

## 2025-07-10 ENCOUNTER — APPOINTMENT (OUTPATIENT)
Dept: CARDIOLOGY | Facility: CLINIC | Age: 80
End: 2025-07-10
Payer: MEDICARE

## 2025-07-10 ENCOUNTER — NON-APPOINTMENT (OUTPATIENT)
Age: 80
End: 2025-07-10

## 2025-07-10 PROCEDURE — 93294 REM INTERROG EVL PM/LDLS PM: CPT

## 2025-07-10 PROCEDURE — 93296 REM INTERROG EVL PM/IDS: CPT
